# Patient Record
Sex: MALE | Race: WHITE | NOT HISPANIC OR LATINO | Employment: FULL TIME | URBAN - METROPOLITAN AREA
[De-identification: names, ages, dates, MRNs, and addresses within clinical notes are randomized per-mention and may not be internally consistent; named-entity substitution may affect disease eponyms.]

---

## 2022-06-06 ENCOUNTER — OFFICE VISIT (OUTPATIENT)
Dept: INTERNAL MEDICINE | Facility: CLINIC | Age: 57
End: 2022-06-06

## 2022-06-06 ENCOUNTER — LAB (OUTPATIENT)
Dept: LAB | Facility: HOSPITAL | Age: 57
End: 2022-06-06

## 2022-06-06 VITALS
HEIGHT: 71 IN | BODY MASS INDEX: 31.78 KG/M2 | HEART RATE: 80 BPM | RESPIRATION RATE: 16 BRPM | DIASTOLIC BLOOD PRESSURE: 86 MMHG | WEIGHT: 227 LBS | SYSTOLIC BLOOD PRESSURE: 150 MMHG | OXYGEN SATURATION: 98 % | TEMPERATURE: 98.4 F

## 2022-06-06 DIAGNOSIS — Z12.11 COLON CANCER SCREENING: ICD-10-CM

## 2022-06-06 DIAGNOSIS — Z00.00 ANNUAL PHYSICAL EXAM: ICD-10-CM

## 2022-06-06 DIAGNOSIS — E66.09 CLASS 1 OBESITY DUE TO EXCESS CALORIES WITH SERIOUS COMORBIDITY AND BODY MASS INDEX (BMI) OF 31.0 TO 31.9 IN ADULT: ICD-10-CM

## 2022-06-06 DIAGNOSIS — G47.33 OSA (OBSTRUCTIVE SLEEP APNEA): ICD-10-CM

## 2022-06-06 DIAGNOSIS — R53.83 FATIGUE, UNSPECIFIED TYPE: ICD-10-CM

## 2022-06-06 DIAGNOSIS — I10 ESSENTIAL HYPERTENSION: Primary | ICD-10-CM

## 2022-06-06 LAB
ALBUMIN SERPL-MCNC: 4.6 G/DL (ref 3.5–5.2)
ALBUMIN/GLOB SERPL: 1.7 G/DL
ALP SERPL-CCNC: 83 U/L (ref 39–117)
ALT SERPL W P-5'-P-CCNC: 33 U/L (ref 1–41)
ANION GAP SERPL CALCULATED.3IONS-SCNC: 12 MMOL/L (ref 5–15)
AST SERPL-CCNC: 33 U/L (ref 1–40)
BILIRUB SERPL-MCNC: 0.8 MG/DL (ref 0–1.2)
BUN SERPL-MCNC: 10 MG/DL (ref 6–20)
BUN/CREAT SERPL: 9.7 (ref 7–25)
CALCIUM SPEC-SCNC: 9.1 MG/DL (ref 8.6–10.5)
CHLORIDE SERPL-SCNC: 103 MMOL/L (ref 98–107)
CHOLEST SERPL-MCNC: 177 MG/DL (ref 0–200)
CO2 SERPL-SCNC: 23 MMOL/L (ref 22–29)
CREAT SERPL-MCNC: 1.03 MG/DL (ref 0.76–1.27)
DEPRECATED RDW RBC AUTO: 39.8 FL (ref 37–54)
EGFRCR SERPLBLD CKD-EPI 2021: 84.7 ML/MIN/1.73
ERYTHROCYTE [DISTWIDTH] IN BLOOD BY AUTOMATED COUNT: 12.8 % (ref 12.3–15.4)
GLOBULIN UR ELPH-MCNC: 2.7 GM/DL
GLUCOSE SERPL-MCNC: 75 MG/DL (ref 65–99)
HBA1C MFR BLD: 5.5 % (ref 4.8–5.6)
HCT VFR BLD AUTO: 44.2 % (ref 37.5–51)
HDLC SERPL-MCNC: 52 MG/DL (ref 40–60)
HGB BLD-MCNC: 14.8 G/DL (ref 13–17.7)
LDLC SERPL CALC-MCNC: 99 MG/DL (ref 0–100)
LDLC/HDLC SERPL: 1.82 {RATIO}
MCH RBC QN AUTO: 29 PG (ref 26.6–33)
MCHC RBC AUTO-ENTMCNC: 33.5 G/DL (ref 31.5–35.7)
MCV RBC AUTO: 86.7 FL (ref 79–97)
PLATELET # BLD AUTO: 215 10*3/MM3 (ref 140–450)
PMV BLD AUTO: 11.8 FL (ref 6–12)
POTASSIUM SERPL-SCNC: 3.7 MMOL/L (ref 3.5–5.2)
PROT SERPL-MCNC: 7.3 G/DL (ref 6–8.5)
RBC # BLD AUTO: 5.1 10*6/MM3 (ref 4.14–5.8)
SODIUM SERPL-SCNC: 138 MMOL/L (ref 136–145)
TRIGL SERPL-MCNC: 151 MG/DL (ref 0–150)
VLDLC SERPL-MCNC: 26 MG/DL (ref 5–40)
WBC NRBC COR # BLD: 5.15 10*3/MM3 (ref 3.4–10.8)

## 2022-06-06 PROCEDURE — 83036 HEMOGLOBIN GLYCOSYLATED A1C: CPT | Performed by: INTERNAL MEDICINE

## 2022-06-06 PROCEDURE — 99214 OFFICE O/P EST MOD 30 MIN: CPT | Performed by: INTERNAL MEDICINE

## 2022-06-06 PROCEDURE — 99386 PREV VISIT NEW AGE 40-64: CPT | Performed by: INTERNAL MEDICINE

## 2022-06-06 PROCEDURE — 80061 LIPID PANEL: CPT | Performed by: INTERNAL MEDICINE

## 2022-06-06 PROCEDURE — 80050 GENERAL HEALTH PANEL: CPT | Performed by: INTERNAL MEDICINE

## 2022-06-06 PROCEDURE — 82607 VITAMIN B-12: CPT | Performed by: INTERNAL MEDICINE

## 2022-06-06 NOTE — PROGRESS NOTES
"veronica       Office Note      Date: 2022  Patient Name: Ramon Crouch  MRN: [id number]  : 1965    Chief Complaint   Patient presents with   • Fatigue   • Hypertension       History of Present Illness: Ramon Crouch is a 56 y.o. male who presents for Fatigue and Hypertension.   Previously weighed 210 lbs last year. Has gained weight but is working on weight loss. Has veronica and just got his CPAP back from his move today. BP when he checks at home is in the systolics 130's.   Subjective      Review of Systems:   Pertinent review of systems per HPI.    Review of Systems   Constitutional: Positive for fatigue. Negative for activity change, appetite change, chills and diaphoresis.   HENT: Negative for congestion, dental problem, drooling, ear discharge, ear pain, facial swelling and hearing loss.    Eyes: Negative for photophobia, pain, discharge, redness, itching and visual disturbance.   Respiratory: Negative for cough, choking, chest tightness and shortness of breath.    Cardiovascular: Negative for chest pain, palpitations and leg swelling.   Endocrine: Negative for cold intolerance, heat intolerance, polydipsia and polyuria.   Genitourinary: Negative for difficulty urinating, dysuria, flank pain, frequency and hematuria.   Musculoskeletal: Negative for arthralgias and back pain.   Skin: Negative for color change, pallor, rash and wound.   Allergic/Immunologic: Negative for environmental allergies.   Neurological: Negative for dizziness, facial asymmetry, light-headedness and headaches.   Psychiatric/Behavioral: Negative.    All other systems reviewed and are negative.    No Known Allergies    Objective     Physical Exam:  Vital Signs:   Vitals:    22 1446   BP: 150/86   Pulse: 80   Resp: 16   Temp: 98.4 °F (36.9 °C)   TempSrc: Temporal   SpO2: 98%   Weight: 103 kg (227 lb)   Height: 180.3 cm (71\")      Body mass index is 31.66 kg/m².    Physical Exam  Vitals and nursing note reviewed. "   Constitutional:       General: He is not in acute distress.     Appearance: He is well-developed.   HENT:      Head: Normocephalic and atraumatic.      Right Ear: External ear normal.      Left Ear: External ear normal.   Eyes:      General: No scleral icterus.        Right eye: No discharge.         Left eye: No discharge.      Conjunctiva/sclera: Conjunctivae normal.   Cardiovascular:      Rate and Rhythm: Normal rate and regular rhythm.      Heart sounds: Normal heart sounds. No murmur heard.    No friction rub. No gallop.   Pulmonary:      Effort: Pulmonary effort is normal. No respiratory distress.      Breath sounds: Normal breath sounds. No wheezing or rales.   Musculoskeletal:      Right lower leg: No edema.      Left lower leg: No edema.   Skin:     General: Skin is warm and dry.      Coloration: Skin is not pale.         Assessment / Plan      Assessment & Plan:    1. Essential hypertension  Discussed weight loss, keep BP log at home and if SBP above 140-150 f/u in clinic    2. Annual physical exam  Counseled on:  Mammo starting at age 40  Pap smear q5 years if normal pap cytology with neg HPV, otherwise q3 years  Colonoscopy at 43 y/o  PNA vaccinations starting at age 65  shingrix at age 50  Td/Tdap q 10 years  Wear seatbelt when driving  Flu shot annually]  - Comprehensive Metabolic Panel  - CBC (No Diff)  - Lipid Panel  - TSH Rfx On Abnormal To Free T4  - Vitamin B12  - Hemoglobin A1c    3. Fatigue, unspecified type  Likely due to cpap non compliance. Checking tsh, cbc and vitb12    4. Colon cancer screening    - Ambulatory Referral to Gastroenterology    5. TYSON (obstructive sleep apnea)  Discussed se of untx TYSON including weight gain, htn and fatigue.     6. Class 1 obesity due to excess calories with serious comorbidity and body mass index (BMI) of 31.0 to 31.9 in adult  disucssed weight loss, diet and exercise. Check a1c, tsh, and lipid panel      Anabel Keller MD  06/06/2022

## 2022-06-07 LAB
TSH SERPL DL<=0.05 MIU/L-ACNC: 1.03 UIU/ML (ref 0.27–4.2)
VIT B12 BLD-MCNC: 236 PG/ML (ref 211–946)

## 2022-07-21 DIAGNOSIS — Z12.11 ENCOUNTER FOR SCREENING COLONOSCOPY: Primary | ICD-10-CM

## 2022-08-12 ENCOUNTER — OUTSIDE FACILITY SERVICE (OUTPATIENT)
Dept: GASTROENTEROLOGY | Facility: CLINIC | Age: 57
End: 2022-08-12

## 2022-08-12 PROCEDURE — 88305 TISSUE EXAM BY PATHOLOGIST: CPT | Performed by: INTERNAL MEDICINE

## 2022-08-12 PROCEDURE — 45385 COLONOSCOPY W/LESION REMOVAL: CPT | Performed by: INTERNAL MEDICINE

## 2022-08-15 ENCOUNTER — LAB REQUISITION (OUTPATIENT)
Dept: LAB | Facility: HOSPITAL | Age: 57
End: 2022-08-15

## 2022-08-15 DIAGNOSIS — K64.8 OTHER HEMORRHOIDS: ICD-10-CM

## 2022-08-15 DIAGNOSIS — D12.4 BENIGN NEOPLASM OF DESCENDING COLON: ICD-10-CM

## 2022-08-15 DIAGNOSIS — D12.3 BENIGN NEOPLASM OF TRANSVERSE COLON: ICD-10-CM

## 2022-08-15 DIAGNOSIS — D12.0 BENIGN NEOPLASM OF CECUM: ICD-10-CM

## 2022-08-15 DIAGNOSIS — D12.5 BENIGN NEOPLASM OF SIGMOID COLON: ICD-10-CM

## 2022-08-15 DIAGNOSIS — Q43.8 OTHER SPECIFIED CONGENITAL MALFORMATIONS OF INTESTINE: ICD-10-CM

## 2022-08-15 DIAGNOSIS — Z12.11 ENCOUNTER FOR SCREENING FOR MALIGNANT NEOPLASM OF COLON: ICD-10-CM

## 2022-08-15 DIAGNOSIS — D12.8 BENIGN NEOPLASM OF RECTUM: ICD-10-CM

## 2022-08-16 LAB
CYTO UR: NORMAL
LAB AP CASE REPORT: NORMAL
LAB AP CLINICAL INFORMATION: NORMAL
PATH REPORT.FINAL DX SPEC: NORMAL
PATH REPORT.GROSS SPEC: NORMAL

## 2022-11-17 ENCOUNTER — OFFICE VISIT (OUTPATIENT)
Dept: INTERNAL MEDICINE | Facility: CLINIC | Age: 57
End: 2022-11-17

## 2022-11-17 VITALS
HEART RATE: 68 BPM | HEIGHT: 71 IN | SYSTOLIC BLOOD PRESSURE: 120 MMHG | DIASTOLIC BLOOD PRESSURE: 70 MMHG | WEIGHT: 226 LBS | BODY MASS INDEX: 31.64 KG/M2 | TEMPERATURE: 96.9 F

## 2022-11-17 DIAGNOSIS — K42.9 UMBILICAL HERNIA WITHOUT OBSTRUCTION AND WITHOUT GANGRENE: Primary | ICD-10-CM

## 2022-11-17 PROCEDURE — 99213 OFFICE O/P EST LOW 20 MIN: CPT | Performed by: INTERNAL MEDICINE

## 2022-12-05 NOTE — PROGRESS NOTES
"     Office Note      Date: 2022  Patient Name: Duncan Crouch  MRN: [id number]  : 1965    Chief Complaint   Patient presents with   • Hearing Problem       History of Present Illness: Duncan Crouch is a 57 y.o. male who presents for Hearing Problem. Has tender umbilical hernia that has gotten larger.     Subjective      Review of Systems:   Pertinent review of systems per HPI.    Review of Systems   Constitutional: Negative for activity change, appetite change, chills, diaphoresis and fatigue.   HENT: Negative for congestion, dental problem, drooling, ear discharge, ear pain, facial swelling and hearing loss.    Eyes: Negative for photophobia, pain, discharge, redness, itching and visual disturbance.   Respiratory: Negative for cough, choking, chest tightness and shortness of breath.    Cardiovascular: Negative for chest pain, palpitations and leg swelling.   Gastrointestinal: Positive for abdominal pain.   Endocrine: Negative for cold intolerance, heat intolerance, polydipsia and polyuria.   Genitourinary: Negative for difficulty urinating, dysuria, flank pain, frequency and hematuria.   Musculoskeletal: Negative for arthralgias and back pain.   Skin: Negative for color change, pallor, rash and wound.   Allergic/Immunologic: Negative for environmental allergies.   Neurological: Negative for dizziness, facial asymmetry, light-headedness and headaches.   Psychiatric/Behavioral: Negative.    All other systems reviewed and are negative.    No Known Allergies    Objective     Physical Exam:  Vital Signs:   Vitals:    22 1158   BP: 120/70   BP Location: Left arm   Patient Position: Sitting   Pulse: 68   Temp: 96.9 °F (36.1 °C)   TempSrc: Infrared   Weight: 103 kg (226 lb)   Height: 180.3 cm (70.98\")      Body mass index is 31.53 kg/m².    Physical Exam  Vitals and nursing note reviewed.   Constitutional:       General: He is not in acute distress.     Appearance: He is well-developed. "   HENT:      Head: Normocephalic and atraumatic.      Right Ear: External ear normal.      Left Ear: External ear normal.   Eyes:      General: No scleral icterus.        Right eye: No discharge.         Left eye: No discharge.      Conjunctiva/sclera: Conjunctivae normal.   Cardiovascular:      Rate and Rhythm: Normal rate and regular rhythm.      Heart sounds: Normal heart sounds. No murmur heard.    No friction rub. No gallop.   Pulmonary:      Effort: Pulmonary effort is normal. No respiratory distress.      Breath sounds: Normal breath sounds. No wheezing or rales.   Abdominal:      General: Abdomen is flat. Bowel sounds are normal. There is no distension.      Palpations: Abdomen is soft. There is no mass.      Tenderness: There is no abdominal tenderness.      Hernia: A hernia (umbilical hernia, not reducible) is present.   Skin:     General: Skin is warm and dry.      Coloration: Skin is not pale.         Assessment / Plan      Assessment & Plan:    1. Umbilical hernia without obstruction and without gangrene  Avoid heavy lifting, work on weight loss. Ref to sx and get u/s for further eval  - US abdomen limited; Future  - Ambulatory Referral to General Surgery      Anabel Keller MD  11/17/2022

## 2022-12-07 ENCOUNTER — HOSPITAL ENCOUNTER (OUTPATIENT)
Dept: ULTRASOUND IMAGING | Facility: HOSPITAL | Age: 57
Discharge: HOME | End: 2022-12-07
Admitting: INTERNAL MEDICINE

## 2022-12-07 DIAGNOSIS — K42.9 UMBILICAL HERNIA WITHOUT OBSTRUCTION AND WITHOUT GANGRENE: ICD-10-CM

## 2022-12-07 PROCEDURE — 76705 ECHO EXAM OF ABDOMEN: CPT

## 2023-11-02 ENCOUNTER — LAB (OUTPATIENT)
Dept: INTERNAL MEDICINE | Facility: CLINIC | Age: 58
End: 2023-11-02
Payer: COMMERCIAL

## 2023-11-02 ENCOUNTER — OFFICE VISIT (OUTPATIENT)
Dept: INTERNAL MEDICINE | Facility: CLINIC | Age: 58
End: 2023-11-02
Payer: COMMERCIAL

## 2023-11-02 VITALS
SYSTOLIC BLOOD PRESSURE: 116 MMHG | BODY MASS INDEX: 33.32 KG/M2 | HEART RATE: 86 BPM | HEIGHT: 71 IN | WEIGHT: 238 LBS | DIASTOLIC BLOOD PRESSURE: 80 MMHG | OXYGEN SATURATION: 91 %

## 2023-11-02 DIAGNOSIS — R97.20 ELEVATED PSA: ICD-10-CM

## 2023-11-02 DIAGNOSIS — M1A.0710 IDIOPATHIC CHRONIC GOUT OF RIGHT FOOT WITHOUT TOPHUS: ICD-10-CM

## 2023-11-02 DIAGNOSIS — Z11.4 SCREENING FOR HIV (HUMAN IMMUNODEFICIENCY VIRUS): ICD-10-CM

## 2023-11-02 DIAGNOSIS — Z00.00 ANNUAL PHYSICAL EXAM: Primary | ICD-10-CM

## 2023-11-02 DIAGNOSIS — Z11.59 NEED FOR HEPATITIS C SCREENING TEST: ICD-10-CM

## 2023-11-02 DIAGNOSIS — N52.1 ERECTILE DYSFUNCTION DUE TO DISEASES CLASSIFIED ELSEWHERE: ICD-10-CM

## 2023-11-02 DIAGNOSIS — Z12.11 SCREENING FOR COLORECTAL CANCER: ICD-10-CM

## 2023-11-02 DIAGNOSIS — R68.82 LOW LIBIDO: ICD-10-CM

## 2023-11-02 DIAGNOSIS — E53.8 B12 DEFICIENCY: ICD-10-CM

## 2023-11-02 DIAGNOSIS — Z12.12 SCREENING FOR COLORECTAL CANCER: ICD-10-CM

## 2023-11-02 DIAGNOSIS — Z12.5 PROSTATE CANCER SCREENING: ICD-10-CM

## 2023-11-02 DIAGNOSIS — R73.03 PREDIABETES: ICD-10-CM

## 2023-11-02 DIAGNOSIS — K42.9 UMBILICAL HERNIA WITHOUT OBSTRUCTION AND WITHOUT GANGRENE: ICD-10-CM

## 2023-11-02 DIAGNOSIS — E55.9 VITAMIN D DEFICIENCY: ICD-10-CM

## 2023-11-02 LAB
25(OH)D3 SERPL-MCNC: 19.7 NG/ML (ref 30–100)
BASOPHILS # BLD AUTO: 0.05 10*3/MM3 (ref 0–0.2)
BASOPHILS NFR BLD AUTO: 0.8 % (ref 0–1.5)
DEPRECATED RDW RBC AUTO: 40.4 FL (ref 37–54)
EOSINOPHIL # BLD AUTO: 0.07 10*3/MM3 (ref 0–0.4)
EOSINOPHIL NFR BLD AUTO: 1.1 % (ref 0.3–6.2)
ERYTHROCYTE [DISTWIDTH] IN BLOOD BY AUTOMATED COUNT: 12.8 % (ref 12.3–15.4)
HCT VFR BLD AUTO: 44.2 % (ref 37.5–51)
HCV AB SER DONR QL: NORMAL
HGB BLD-MCNC: 15.2 G/DL (ref 13–17.7)
HIV 1+2 AB+HIV1 P24 AG SERPL QL IA: NORMAL
IMM GRANULOCYTES # BLD AUTO: 0.04 10*3/MM3 (ref 0–0.05)
IMM GRANULOCYTES NFR BLD AUTO: 0.6 % (ref 0–0.5)
LYMPHOCYTES # BLD AUTO: 2.31 10*3/MM3 (ref 0.7–3.1)
LYMPHOCYTES NFR BLD AUTO: 35.5 % (ref 19.6–45.3)
MCH RBC QN AUTO: 30 PG (ref 26.6–33)
MCHC RBC AUTO-ENTMCNC: 34.4 G/DL (ref 31.5–35.7)
MCV RBC AUTO: 87.4 FL (ref 79–97)
MONOCYTES # BLD AUTO: 0.4 10*3/MM3 (ref 0.1–0.9)
MONOCYTES NFR BLD AUTO: 6.2 % (ref 5–12)
NEUTROPHILS NFR BLD AUTO: 3.63 10*3/MM3 (ref 1.7–7)
NEUTROPHILS NFR BLD AUTO: 55.8 % (ref 42.7–76)
NRBC BLD AUTO-RTO: 0 /100 WBC (ref 0–0.2)
PLATELET # BLD AUTO: 213 10*3/MM3 (ref 140–450)
PMV BLD AUTO: 11.6 FL (ref 6–12)
PSA SERPL-MCNC: 6.48 NG/ML (ref 0–4)
RBC # BLD AUTO: 5.06 10*6/MM3 (ref 4.14–5.8)
TSH SERPL DL<=0.05 MIU/L-ACNC: 0.99 UIU/ML (ref 0.27–4.2)
VIT B12 BLD-MCNC: 207 PG/ML (ref 211–946)
WBC NRBC COR # BLD: 6.5 10*3/MM3 (ref 3.4–10.8)

## 2023-11-02 PROCEDURE — 86803 HEPATITIS C AB TEST: CPT | Performed by: INTERNAL MEDICINE

## 2023-11-02 PROCEDURE — G0103 PSA SCREENING: HCPCS | Performed by: INTERNAL MEDICINE

## 2023-11-02 PROCEDURE — 82306 VITAMIN D 25 HYDROXY: CPT | Performed by: INTERNAL MEDICINE

## 2023-11-02 PROCEDURE — G0432 EIA HIV-1/HIV-2 SCREEN: HCPCS | Performed by: INTERNAL MEDICINE

## 2023-11-02 PROCEDURE — 87086 URINE CULTURE/COLONY COUNT: CPT | Performed by: INTERNAL MEDICINE

## 2023-11-02 PROCEDURE — 84550 ASSAY OF BLOOD/URIC ACID: CPT | Performed by: INTERNAL MEDICINE

## 2023-11-02 PROCEDURE — 84270 ASSAY OF SEX HORMONE GLOBUL: CPT | Performed by: INTERNAL MEDICINE

## 2023-11-02 PROCEDURE — 80050 GENERAL HEALTH PANEL: CPT | Performed by: INTERNAL MEDICINE

## 2023-11-02 PROCEDURE — 82607 VITAMIN B-12: CPT | Performed by: INTERNAL MEDICINE

## 2023-11-02 PROCEDURE — 83036 HEMOGLOBIN GLYCOSYLATED A1C: CPT | Performed by: INTERNAL MEDICINE

## 2023-11-02 PROCEDURE — 80061 LIPID PANEL: CPT | Performed by: INTERNAL MEDICINE

## 2023-11-02 RX ORDER — ALLOPURINOL 100 MG/1
100 TABLET ORAL DAILY
Qty: 30 TABLET | Refills: 2 | Status: SHIPPED | OUTPATIENT
Start: 2023-11-02

## 2023-11-02 RX ORDER — TADALAFIL 5 MG/1
5 TABLET ORAL DAILY PRN
Qty: 30 TABLET | Refills: 5 | Status: SHIPPED | OUTPATIENT
Start: 2023-11-02

## 2023-11-02 NOTE — PROGRESS NOTES
Male Physical Note      Date: 2023   Patient Name: Duncan Crouch  : 1965   MRN: [id number]     Chief Complaint:    Chief Complaint   Patient presents with    Follow-up       History of Present Illness: Duncan Crouch is a 58 y.o. male who is here today for their annual health maintenance and physical. Pt  with recent gout flare. He is wanting to go on chronic suppressive medication.  He also has an umblical hernia and wants referral for repair.      Subjective      Review of Systems   Constitutional:  Negative for activity change, chills, diaphoresis and fever.   HENT:  Negative for congestion and ear pain.    Eyes:  Negative for discharge and redness.   Respiratory:  Negative for cough, chest tightness and shortness of breath.    Cardiovascular:  Negative for chest pain.   Gastrointestinal:  Negative for abdominal distention and abdominal pain.   Endocrine: Negative for cold intolerance and heat intolerance.   Genitourinary:  Negative for flank pain and hematuria.   Musculoskeletal:  Negative for arthralgias and back pain.   Neurological:  Negative for dizziness, light-headedness and headaches.   Psychiatric/Behavioral:  Negative for agitation and confusion.         I have reviewed the following portions of the patient's history and these were updated and discussed with the patient as appropriate: past family history, past medical history, past social history, past surgical history, and problem list.      Medications:     Current Outpatient Medications:     Sod Picosulfate-Mag Ox-Cit Acd 10-3.5-12 MG-GM -GM/160ML solution, Take 160 mL by mouth Take As Directed for 2 doses., Disp: 320 mL, Rfl: 0    allopurinol (ZYLOPRIM) 100 MG tablet, Take 1 tablet by mouth Daily., Disp: 30 tablet, Rfl: 2    Cholecalciferol (Vitamin D) 50 MCG ( UT) tablet, Take 1 tablet by mouth Daily., Disp: 30 tablet, Rfl: 5    Cyanocobalamin (B-12) 1000 MCG tablet, Take 1 tablet by mouth Daily., Disp: 30 tablet,  Rfl: 2    metFORMIN (GLUCOPHAGE) 500 MG tablet, Take 1 tablet by mouth Daily With Breakfast., Disp: 30 tablet, Rfl: 5    tadalafil (Cialis) 5 MG tablet, Take 1 tablet by mouth Daily As Needed for Erectile Dysfunction., Disp: 30 tablet, Rfl: 5    Allergies:   No Known Allergies    Immunizations:  Immunization History   Administered Date(s) Administered    COVID-19 ([place]) 2021    Covid-19 ([employer]) Gray Cap Monovalent 2022    Dt, Ipv Adsorbed Historical 2017      Colorectal Screening:     Last Completed Colonoscopy            COLORECTAL CANCER SCREENING (COLONOSCOPY - Every 10 Years) Next due on 2022  SCANNED - COLONOSCOPY                  CT for Smoker (Age 55-75, 30pk yr):   US Aorta (For male smokers, age 65):   PSA(Over age 50): PSA today  Hep C ( 1939-1126): get today  HIV get today    Diet/Physical activity:  Poor diet and exercise    Sexual health: has ED    Sexual Health Inventory for Men (OUMAR)   Over the past 6 months:     1. How do you rate your confidence that you could get and keep an erection?  2 - Low   2. When you had erections with sexual     stimulation, how often were your erections hard enough for penetration (entering your partner)?  3 - Sometimes (About half the time)    3. During sexual intercourse, how often were you able to maintain your erection after you had penetrated (entered) your partner?  5 - Almost always or always    4. During sexual intercourse, how difficult was it to maintain your erection to completion of intercourse?  5 - Not difficult    5. When you attempted sexual intercourse, how often was it satisfactory for you?  4 - Most times ( much more than, half the time)    Total Score: 19   The Sexual Health Inventory for Men further classifies ED severity with the following breakpoints:   1-7 (Severe ED) 8-11 (Moderate ED) 12-16 (Mild to Moderate ED) 17-21 (Mild ED)        PHQ-9 Depression Screening  Little interest or pleasure in doing  "things? 0-->not at all   Feeling down, depressed, or hopeless? 0-->not at all   Trouble falling or staying asleep, or sleeping too much?     Feeling tired or having little energy?     Poor appetite or overeating?     Feeling bad about yourself - or that you are a failure or have let yourself or your family down?     Trouble concentrating on things, such as reading the newspaper or watching television?     Moving or speaking so slowly that other people could have noticed? Or the opposite - being so fidgety or restless that you have been moving around a lot more than usual?     Thoughts that you would be better off dead, or of hurting yourself in some way?     PHQ-9 Total Score 0   If you checked off any problems, how difficult have these problems made it for you to do your work, take care of things at home, or get along with other people?       ADINA-7        Objective     Physical Exam:  Vital Signs:   Vitals:    11/02/23 1301   BP: 116/80   Pulse: 86   SpO2: 91%   Weight: 108 kg (238 lb)   Height: 180.3 cm (70.98\")     Body mass index is 33.21 kg/m².     Physical Exam  Constitutional:       General: He is not in acute distress.     Appearance: Normal appearance. He is not ill-appearing.   HENT:      Right Ear: Tympanic membrane normal.      Left Ear: Tympanic membrane and ear canal normal.      Nose: No congestion or rhinorrhea.      Mouth/Throat:      Mouth: Mucous membranes are moist.      Pharynx: No oropharyngeal exudate.   Eyes:      Extraocular Movements: Extraocular movements intact.      Conjunctiva/sclera: Conjunctivae normal.      Pupils: Pupils are equal, round, and reactive to light.   Cardiovascular:      Rate and Rhythm: Normal rate and regular rhythm.      Heart sounds: No murmur heard.  Pulmonary:      Effort: Pulmonary effort is normal. No respiratory distress.   Abdominal:      General: Abdomen is flat. Bowel sounds are normal.      Palpations: Abdomen is soft.      Tenderness: There is no abdominal " "tenderness.   Musculoskeletal:      Right lower leg: No edema.      Left lower leg: No edema.   Skin:     General: Skin is warm and dry.      Findings: No rash.   Neurological:      General: No focal deficit present.      Mental Status: He is alert and oriented to person, place, and time. Mental status is at baseline.   Psychiatric:         Mood and Affect: Mood normal.         Behavior: Behavior normal.         Procedures    LABS:   Lab Results   Component Value Date    HGBA1C 5.70 (H) 11/02/2023     No results found for: \"MICROALBUR\", \"[id number]\"     Assessment / Plan      Assessment/Plan:   Diagnoses and all orders for this visit:    1. Annual physical exam (Primary)  -     Hemoglobin A1c  -     Vitamin B12  -     TSH  -     Lipid Panel  -     Comprehensive Metabolic Panel  -     CBC & Differential  -     Urine Culture - Urine, Urine, Clean Catch    2. Vitamin D deficiency  -     Vitamin D,25-Hydroxy  -     Cholecalciferol (Vitamin D) 50 MCG (2000 UT) tablet; Take 1 tablet by mouth Daily.  Dispense: 30 tablet; Refill: 5    3. Need for hepatitis C screening test  -     Hepatitis C Antibody    4. Screening for HIV (human immunodeficiency virus)  -     HIV-1 / O / 2 Ag / Antibody    5. Idiopathic chronic gout of right foot without tophus  -     Uric acid    6. Umbilical hernia without obstruction and without gangrene  -     Ambulatory Referral to General Surgery    7. Screening for colorectal cancer  -     Ambulatory Referral to Gastroenterology    8. Prostate cancer screening  -     PSA SCREENING    9. Low libido  -     Testosterone, Free, Total; Future  -     Sex Horm Binding Globulin; Future    10. Erectile dysfunction due to diseases classified elsewhere  -     tadalafil (Cialis) 5 MG tablet; Take 1 tablet by mouth Daily As Needed for Erectile Dysfunction.  Dispense: 30 tablet; Refill: 5    11. Prediabetes  -     metFORMIN (GLUCOPHAGE) 500 MG tablet; Take 1 tablet by mouth Daily With Breakfast.  Dispense: 30 " tablet; Refill: 5    12. Elevated PSA    13. B12 deficiency  -     Cyanocobalamin (B-12) 1000 MCG tablet; Take 1 tablet by mouth Daily.  Dispense: 30 tablet; Refill: 2    Other orders  -     allopurinol (ZYLOPRIM) 100 MG tablet; Take 1 tablet by mouth Daily.  Dispense: 30 tablet; Refill: 2         Having ED.  Will try cialis.  His weight is a problem. He is going to start exercising and wants to go back on low carb. Recommended weight watchers.  Need to lose 20lbs by  next visit.  Recheck uric acid next visit.     Labs returned with low vit d and b12.  Supplement.     PSA elevated.  Will refer to urology for further monitoring and workup.  Possible just bph    Pt now prediabetic. Start metformin.  Likely start ozempic at next visit.     Recheck cholesterol after weight loss    BMI is >= 30 and <35. (Class 1 Obesity). The following options were offered after discussion;: weight loss educational material (shared in after visit summary) and exercise counseling/recommendations       Follow Up:   Return in about 3 months (around 2/2/2024) for Recheck.    Healthcare Maintenance:   Counseling provided on exercise, nutrition, age-appropriate screening test, and appropriate lab studies.   Duncan Crouch voices understanding and acceptance of this advice and will call back with any further questions or concerns. AVS with preventive healthcare tips printed for patient.     Reviewed the following with the patient: [employer] educational material given to patient, encouraged patient to exercise 5-7 days per week for 30 minutes at a time, and Weight Watchers encouraged.      Marcos Allan DO   [facility] [place] [facility]

## 2023-11-03 PROBLEM — E55.9 VITAMIN D DEFICIENCY: Status: ACTIVE | Noted: 2023-11-03

## 2023-11-03 PROBLEM — R68.82 LOW LIBIDO: Status: ACTIVE | Noted: 2023-11-03

## 2023-11-03 PROBLEM — N52.1 ERECTILE DYSFUNCTION DUE TO DISEASES CLASSIFIED ELSEWHERE: Status: ACTIVE | Noted: 2023-11-03

## 2023-11-03 PROBLEM — R73.03 PREDIABETES: Status: ACTIVE | Noted: 2023-11-03

## 2023-11-03 PROBLEM — R97.20 ELEVATED PSA: Status: ACTIVE | Noted: 2023-11-03

## 2023-11-03 PROBLEM — E53.8 B12 DEFICIENCY: Status: ACTIVE | Noted: 2023-11-03

## 2023-11-03 LAB
ALBUMIN SERPL-MCNC: 4.5 G/DL (ref 3.5–5.2)
ALBUMIN/GLOB SERPL: 1.5 G/DL
ALP SERPL-CCNC: 74 U/L (ref 39–117)
ALT SERPL W P-5'-P-CCNC: 30 U/L (ref 1–41)
ANION GAP SERPL CALCULATED.3IONS-SCNC: 12 MMOL/L (ref 5–15)
AST SERPL-CCNC: 30 U/L (ref 1–40)
BACTERIA SPEC AEROBE CULT: NO GROWTH
BILIRUB SERPL-MCNC: 0.8 MG/DL (ref 0–1.2)
BUN SERPL-MCNC: 12 MG/DL (ref 6–20)
BUN/CREAT SERPL: 10 (ref 7–25)
CALCIUM SPEC-SCNC: 9.6 MG/DL (ref 8.6–10.5)
CHLORIDE SERPL-SCNC: 104 MMOL/L (ref 98–107)
CHOLEST SERPL-MCNC: 214 MG/DL (ref 0–200)
CO2 SERPL-SCNC: 24 MMOL/L (ref 22–29)
CREAT SERPL-MCNC: 1.2 MG/DL (ref 0.76–1.27)
EGFRCR SERPLBLD CKD-EPI 2021: 69.7 ML/MIN/1.73
GLOBULIN UR ELPH-MCNC: 3.1 GM/DL
GLUCOSE SERPL-MCNC: 90 MG/DL (ref 65–99)
HBA1C MFR BLD: 5.7 % (ref 4.8–5.6)
HDLC SERPL-MCNC: 53 MG/DL (ref 40–60)
LDLC SERPL CALC-MCNC: 128 MG/DL (ref 0–100)
LDLC/HDLC SERPL: 2.32 {RATIO}
POTASSIUM SERPL-SCNC: 4 MMOL/L (ref 3.5–5.2)
PROT SERPL-MCNC: 7.6 G/DL (ref 6–8.5)
SODIUM SERPL-SCNC: 140 MMOL/L (ref 136–145)
TRIGL SERPL-MCNC: 189 MG/DL (ref 0–150)
URATE SERPL-MCNC: 9.4 MG/DL (ref 3.4–7)
VLDLC SERPL-MCNC: 33 MG/DL (ref 5–40)

## 2023-11-03 RX ORDER — CHOLECALCIFEROL (VITAMIN D3) 50 MCG
2000 TABLET ORAL DAILY
Qty: 30 TABLET | Refills: 5 | Status: SHIPPED | OUTPATIENT
Start: 2023-11-03

## 2023-11-03 RX ORDER — CYANOCOBALAMIN (VITAMIN B-12) 1000 MCG
1 TABLET ORAL DAILY
Qty: 30 TABLET | Refills: 2 | Status: SHIPPED | OUTPATIENT
Start: 2023-11-03

## 2023-11-04 LAB — SHBG SERPL-SCNC: 33.1 NMOL/L (ref 19.3–76.4)

## 2023-12-07 RX ORDER — SODIUM PICOSULFATE, MAGNESIUM OXIDE, AND ANHYDROUS CITRIC ACID 10; 3.5; 12 MG/160ML; G/160ML; G/160ML
350 LIQUID ORAL TAKE AS DIRECTED
Qty: 350 ML | Refills: 0 | Status: SHIPPED | OUTPATIENT
Start: 2023-12-07

## 2023-12-15 ENCOUNTER — OUTSIDE FACILITY SERVICE (OUTPATIENT)
Dept: GASTROENTEROLOGY | Facility: CLINIC | Age: 58
End: 2023-12-15
Payer: COMMERCIAL

## 2023-12-15 PROCEDURE — 88305 TISSUE EXAM BY PATHOLOGIST: CPT

## 2023-12-18 ENCOUNTER — LAB REQUISITION (OUTPATIENT)
Dept: LAB | Facility: HOSPITAL | Age: 58
End: 2023-12-18
Payer: COMMERCIAL

## 2023-12-18 DIAGNOSIS — D12.5 BENIGN NEOPLASM OF SIGMOID COLON: ICD-10-CM

## 2023-12-18 DIAGNOSIS — K64.8 OTHER HEMORRHOIDS: ICD-10-CM

## 2023-12-18 DIAGNOSIS — Z80.0 FAMILY HISTORY OF MALIGNANT NEOPLASM OF DIGESTIVE ORGANS: ICD-10-CM

## 2023-12-18 DIAGNOSIS — Z86.010 PERSONAL HISTORY OF COLONIC POLYPS: ICD-10-CM

## 2023-12-18 DIAGNOSIS — Z83.719 FAMILY HISTORY OF COLON POLYPS, UNSPECIFIED: ICD-10-CM

## 2023-12-18 DIAGNOSIS — D12.3 BENIGN NEOPLASM OF TRANSVERSE COLON: ICD-10-CM

## 2023-12-18 DIAGNOSIS — D12.1 BENIGN NEOPLASM OF APPENDIX: ICD-10-CM

## 2023-12-18 DIAGNOSIS — Z12.11 ENCOUNTER FOR SCREENING FOR MALIGNANT NEOPLASM OF COLON: ICD-10-CM

## 2023-12-18 DIAGNOSIS — D12.7 BENIGN NEOPLASM OF RECTOSIGMOID JUNCTION: ICD-10-CM

## 2023-12-19 LAB — REF LAB TEST METHOD: NORMAL

## 2024-02-09 ENCOUNTER — LAB (OUTPATIENT)
Dept: INTERNAL MEDICINE | Facility: CLINIC | Age: 59
End: 2024-02-09
Payer: COMMERCIAL

## 2024-02-09 ENCOUNTER — OFFICE VISIT (OUTPATIENT)
Dept: INTERNAL MEDICINE | Facility: CLINIC | Age: 59
End: 2024-02-09
Payer: COMMERCIAL

## 2024-02-09 VITALS
HEIGHT: 71 IN | HEART RATE: 76 BPM | BODY MASS INDEX: 31.22 KG/M2 | SYSTOLIC BLOOD PRESSURE: 128 MMHG | WEIGHT: 223 LBS | OXYGEN SATURATION: 97 % | TEMPERATURE: 96.8 F | DIASTOLIC BLOOD PRESSURE: 88 MMHG

## 2024-02-09 DIAGNOSIS — R68.82 LOW LIBIDO: ICD-10-CM

## 2024-02-09 DIAGNOSIS — E55.9 VITAMIN D DEFICIENCY: Primary | ICD-10-CM

## 2024-02-09 DIAGNOSIS — R97.20 ELEVATED PSA: ICD-10-CM

## 2024-02-09 DIAGNOSIS — N52.9 ERECTILE DYSFUNCTION, UNSPECIFIED ERECTILE DYSFUNCTION TYPE: ICD-10-CM

## 2024-02-09 DIAGNOSIS — R73.03 PREDIABETES: ICD-10-CM

## 2024-02-09 DIAGNOSIS — M1A.0710 IDIOPATHIC CHRONIC GOUT OF RIGHT FOOT WITHOUT TOPHUS: ICD-10-CM

## 2024-02-09 LAB — URATE SERPL-MCNC: 7.1 MG/DL (ref 3.4–7)

## 2024-02-09 PROCEDURE — 82306 VITAMIN D 25 HYDROXY: CPT | Performed by: INTERNAL MEDICINE

## 2024-02-09 PROCEDURE — 84403 ASSAY OF TOTAL TESTOSTERONE: CPT | Performed by: INTERNAL MEDICINE

## 2024-02-09 PROCEDURE — 84550 ASSAY OF BLOOD/URIC ACID: CPT | Performed by: INTERNAL MEDICINE

## 2024-02-09 PROCEDURE — 84402 ASSAY OF FREE TESTOSTERONE: CPT | Performed by: INTERNAL MEDICINE

## 2024-02-09 PROCEDURE — 99214 OFFICE O/P EST MOD 30 MIN: CPT | Performed by: INTERNAL MEDICINE

## 2024-02-09 PROCEDURE — 83036 HEMOGLOBIN GLYCOSYLATED A1C: CPT | Performed by: INTERNAL MEDICINE

## 2024-02-09 PROCEDURE — 84153 ASSAY OF PSA TOTAL: CPT | Performed by: INTERNAL MEDICINE

## 2024-02-09 PROCEDURE — 36415 COLL VENOUS BLD VENIPUNCTURE: CPT | Performed by: INTERNAL MEDICINE

## 2024-02-09 PROCEDURE — 84270 ASSAY OF SEX HORMONE GLOBUL: CPT | Performed by: INTERNAL MEDICINE

## 2024-02-09 RX ORDER — TADALAFIL 5 MG/1
5 TABLET ORAL DAILY PRN
Qty: 30 TABLET | Refills: 3 | Status: SHIPPED | OUTPATIENT
Start: 2024-02-09

## 2024-02-09 RX ORDER — FINASTERIDE 5 MG/1
5 TABLET, FILM COATED ORAL DAILY
Qty: 90 TABLET | Refills: 5 | Status: SHIPPED | OUTPATIENT
Start: 2024-02-09

## 2024-02-09 NOTE — PROGRESS NOTES
"     Follow Up Office Visit      Date: 2024   Patient Name: Duncan Crouch  : 1965   MRN: [id number]     Chief Complaint:    Chief Complaint   Patient presents with    Follow-up    Hypertension       History of Present Illness: Duncan Crouch is a 58 y.o. male who is here today to follow up with prediabetes, gout, elevated psa, htn, vit d def.   No gout flares since medicine.  BP doing good. Lost 15lbs did not start meformin       Subjective      Past Medical History:   Diagnosis Date    Fractures     GERD (gastroesophageal reflux disease)     Gout        No past surgical history on file.    No family history on file.     Social History     Socioeconomic History    Marital status: Single   Tobacco Use    Smoking status: Never    Smokeless tobacco: Never   Substance and Sexual Activity    Alcohol use: Yes     Comment: 5    Drug use: Never         I have reviewed the patients family history, social history, past medical history, past surgical history and have updated it as appropriate.     Medications:     Current Outpatient Medications:     allopurinol (ZYLOPRIM) 100 MG tablet, Take 1 tablet by mouth Daily., Disp: 30 tablet, Rfl: 2    Cholecalciferol (Vitamin D) 50 MCG (2000 UT) tablet, Take 1 tablet by mouth Daily., Disp: 30 tablet, Rfl: 5    finasteride (Proscar) 5 MG tablet, Take 1 tablet by mouth Daily., Disp: 90 tablet, Rfl: 5    Allergies:   No Known Allergies    Objective       Vital Signs:   Vitals:    24 1357   BP: 128/88   BP Location: Left arm   Patient Position: Sitting   Cuff Size: Adult   Pulse: 76   Temp: 96.8 °F (36 °C)   TempSrc: Tympanic   SpO2: 97%   Weight: 101 kg (223 lb)   Height: 180.3 cm (71\")     Body mass index is 31.1 kg/m².    Physical Exam:  Physical Exam  Constitutional:       General: He is not in acute distress.     Appearance: Normal appearance. He is not ill-appearing.   HENT:      Right Ear: Tympanic membrane normal.      Left Ear: Tympanic membrane and " ear canal normal.      Nose: No congestion or rhinorrhea.      Mouth/Throat:      Mouth: Mucous membranes are moist.      Pharynx: No oropharyngeal exudate.   Eyes:      Extraocular Movements: Extraocular movements intact.      Conjunctiva/sclera: Conjunctivae normal.      Pupils: Pupils are equal, round, and reactive to light.   Cardiovascular:      Rate and Rhythm: Normal rate and regular rhythm.      Heart sounds: No murmur heard.  Pulmonary:      Effort: Pulmonary effort is normal. No respiratory distress.   Abdominal:      General: Abdomen is flat. Bowel sounds are normal.      Palpations: Abdomen is soft.      Tenderness: There is no abdominal tenderness.   Genitourinary:     Comments: Prostate slightly enlarge to prob 50g.   Musculoskeletal:      Right lower leg: No edema.      Left lower leg: No edema.   Skin:     General: Skin is warm and dry.      Findings: No rash.   Neurological:      General: No focal deficit present.      Mental Status: He is alert and oriented to person, place, and time. Mental status is at baseline.   Psychiatric:         Mood and Affect: Mood normal.         Behavior: Behavior normal.         Procedures    Results:       Assessment / Plan      Assessment/Plan:   Diagnoses and all orders for this visit:    1. Vitamin D deficiency (Primary)  -     Vitamin D 25 hydroxy    2. Prediabetes  -     Hemoglobin A1c    3. Idiopathic chronic gout of right foot without tophus  -     Uric acid    4. Elevated PSA  -     Ambulatory Referral to Urology  -     PSA DIAGNOSTIC  -     finasteride (Proscar) 5 MG tablet; Take 1 tablet by mouth Daily.  Dispense: 90 tablet; Refill: 5    5. Erectile dysfunction, unspecified erectile dysfunction type  -     Testosterone, Free, Total; Future  -     Sex Horm Binding Globulin; Future       Likely BPH. Start proscar. Not much LUTS so hold off on other meds          Follow Up:   No follow-ups on file.    Marcos Allan DO    [facility] [place]  Answers submitted by  the patient for this visit:  Office Visit on 2/9/2024  2:00 PM with Marcos Allan  Primary Reason for Visit (Submitted on 2/7/2024)  What is the primary reason for your visit?: Other  Other (Submitted on 2/7/2024)  Please describe your symptoms.: Low Energy  Have you had these symptoms before?: Yes  How long have you been having these symptoms?: 1-4 days  Please list any medications you are currently taking for this condition.: Vitamin D-3

## 2024-02-10 LAB
25(OH)D3 SERPL-MCNC: 24.8 NG/ML (ref 30–100)
HBA1C MFR BLD: 5.6 % (ref 4.8–5.6)
PSA SERPL-MCNC: 6.49 NG/ML (ref 0–4)

## 2024-02-11 LAB — SHBG SERPL-SCNC: 43.9 NMOL/L (ref 19.3–76.4)

## 2024-02-12 DIAGNOSIS — E55.9 VITAMIN D DEFICIENCY: ICD-10-CM

## 2024-02-12 DIAGNOSIS — N52.9 ERECTILE DYSFUNCTION, UNSPECIFIED ERECTILE DYSFUNCTION TYPE: ICD-10-CM

## 2024-02-12 RX ORDER — CHOLECALCIFEROL (VITAMIN D3) 50 MCG
4000 TABLET ORAL DAILY
Qty: 60 TABLET | Refills: 5 | Status: SHIPPED | OUTPATIENT
Start: 2024-02-12

## 2024-02-13 DIAGNOSIS — M1A.0710 IDIOPATHIC CHRONIC GOUT OF RIGHT FOOT WITHOUT TOPHUS: Primary | ICD-10-CM

## 2024-02-13 RX ORDER — TADALAFIL 5 MG/1
5 TABLET ORAL DAILY PRN
Qty: 30 TABLET | Refills: 3 | OUTPATIENT
Start: 2024-02-13

## 2024-02-13 RX ORDER — CHOLECALCIFEROL (VITAMIN D3) 50 MCG
4000 TABLET ORAL DAILY
Qty: 60 TABLET | Refills: 5 | OUTPATIENT
Start: 2024-02-13

## 2024-02-13 RX ORDER — ALLOPURINOL 100 MG/1
100 TABLET ORAL DAILY
Qty: 30 TABLET | Refills: 2 | Status: SHIPPED | OUTPATIENT
Start: 2024-02-13

## 2024-02-13 RX ORDER — COLCHICINE 0.6 MG/1
0.6 TABLET ORAL 2 TIMES DAILY
Qty: 60 TABLET | Refills: 2 | Status: SHIPPED | OUTPATIENT
Start: 2024-02-13

## 2024-02-20 LAB
TESTOST FREE SERPL-MCNC: 3.7 PG/ML (ref 7.2–24)
TESTOST SERPL-MCNC: 345 NG/DL (ref 264–916)

## 2024-03-04 RX ORDER — ALLOPURINOL 100 MG/1
100 TABLET ORAL DAILY
Qty: 30 TABLET | Refills: 2 | Status: SHIPPED | OUTPATIENT
Start: 2024-03-04

## 2024-04-04 ENCOUNTER — OFFICE VISIT (OUTPATIENT)
Dept: INTERNAL MEDICINE | Facility: CLINIC | Age: 59
End: 2024-04-04
Payer: COMMERCIAL

## 2024-04-04 ENCOUNTER — LAB (OUTPATIENT)
Dept: INTERNAL MEDICINE | Facility: CLINIC | Age: 59
End: 2024-04-04
Payer: COMMERCIAL

## 2024-04-04 VITALS
HEIGHT: 71 IN | OXYGEN SATURATION: 97 % | RESPIRATION RATE: 20 BRPM | BODY MASS INDEX: 32.2 KG/M2 | WEIGHT: 230 LBS | DIASTOLIC BLOOD PRESSURE: 82 MMHG | TEMPERATURE: 98 F | SYSTOLIC BLOOD PRESSURE: 130 MMHG | HEART RATE: 76 BPM

## 2024-04-04 DIAGNOSIS — E53.8 B12 DEFICIENCY: ICD-10-CM

## 2024-04-04 DIAGNOSIS — R60.0 EDEMA OF BOTH LOWER EXTREMITIES: ICD-10-CM

## 2024-04-04 DIAGNOSIS — M79.89 LOCALIZED SWELLING OF LOWER EXTREMITY: Primary | ICD-10-CM

## 2024-04-04 DIAGNOSIS — N52.9 ERECTILE DYSFUNCTION, UNSPECIFIED ERECTILE DYSFUNCTION TYPE: ICD-10-CM

## 2024-04-04 DIAGNOSIS — M1A.0710 IDIOPATHIC CHRONIC GOUT OF RIGHT FOOT WITHOUT TOPHUS: Primary | ICD-10-CM

## 2024-04-04 DIAGNOSIS — N40.0 ENLARGED PROSTATE: ICD-10-CM

## 2024-04-04 PROCEDURE — 99214 OFFICE O/P EST MOD 30 MIN: CPT | Performed by: INTERNAL MEDICINE

## 2024-04-04 PROCEDURE — 36415 COLL VENOUS BLD VENIPUNCTURE: CPT | Performed by: INTERNAL MEDICINE

## 2024-04-04 PROCEDURE — 84550 ASSAY OF BLOOD/URIC ACID: CPT | Performed by: INTERNAL MEDICINE

## 2024-04-04 RX ORDER — NAPROXEN 500 MG/1
500 TABLET ORAL 2 TIMES DAILY PRN
Qty: 60 TABLET | Refills: 2 | Status: SHIPPED | OUTPATIENT
Start: 2024-04-04

## 2024-04-04 RX ORDER — TADALAFIL 10 MG/1
10 TABLET ORAL DAILY PRN
Qty: 30 TABLET | Refills: 0 | Status: SHIPPED | OUTPATIENT
Start: 2024-04-04

## 2024-04-04 RX ORDER — CYANOCOBALAMIN 1000 UG/ML
1000 INJECTION, SOLUTION INTRAMUSCULAR; SUBCUTANEOUS ONCE
Status: COMPLETED | OUTPATIENT
Start: 2024-04-04 | End: 2024-04-04

## 2024-04-04 RX ORDER — METHYLPREDNISOLONE 4 MG/1
TABLET ORAL
Qty: 21 TABLET | Refills: 0 | Status: SHIPPED | OUTPATIENT
Start: 2024-04-04 | End: 2024-04-09

## 2024-04-04 RX ADMIN — CYANOCOBALAMIN 1000 MCG: 1000 INJECTION, SOLUTION INTRAMUSCULAR; SUBCUTANEOUS at 14:39

## 2024-04-04 NOTE — PROGRESS NOTES
Answers submitted by the patient for this visit:  Primary Reason for Visit (Submitted on 2024)  What is the primary reason for your visit?: Other  Other (Submitted on 2024)  Please describe your symptoms.: Swelling & Pain in feet  Have you had these symptoms before?: Yes  How long have you been having these symptoms?: Greater than 2 weeks       Follow Up Office Visit      Date: 2024   Patient Name: Duncan Crouch  : 1965   MRN: [id number]     Chief Complaint:    Chief Complaint   Patient presents with    Foot Swelling     Both right and left feet swelling and aching    Blister     R foot       History of Present Illness: Duncan Crouch is a 58 y.o. male who is here today to follow up with pain and swelling in ankles and feet.  Only hurts when walking mostly, but now right one is acheing.  Taking colchicine.        Subjective      Past Medical History:   Diagnosis Date    Benign prostatic hyperplasia     Fractures     GERD (gastroesophageal reflux disease)     Gout        Past Surgical History:   Procedure Laterality Date    COLONOSCOPY         Family History   Problem Relation Age of Onset    Arthritis Mother         Social History     Socioeconomic History    Marital status: Single   Tobacco Use    Smoking status: Never    Smokeless tobacco: Never   Substance and Sexual Activity    Alcohol use: Yes     Alcohol/week: 5.0 standard drinks of alcohol     Types: 5 Drinks containing 0.5 oz of alcohol per week     Comment: 5    Drug use: Never    Sexual activity: Yes     Partners: Female     Birth control/protection: None         I have reviewed the patients family history, social history, past medical history, past surgical history and have updated it as appropriate.     Medications:     Current Outpatient Medications:     allopurinol (ZYLOPRIM) 100 MG tablet, TAKE 1 TABLET BY MOUTH DAILY, Disp: 30 tablet, Rfl: 2    Cholecalciferol (Vitamin D) 50 MCG ( UT) tablet, Take 2 tablets by  "mouth Daily., Disp: 60 tablet, Rfl: 5    colchicine 0.6 MG tablet, Take 1 tablet by mouth 2 (Two) Times a Day., Disp: 60 tablet, Rfl: 2    tadalafil (Cialis) 5 MG tablet, Take 1 tablet by mouth Daily As Needed for Erectile Dysfunction., Disp: 30 tablet, Rfl: 3    finasteride (Proscar) 5 MG tablet, Take 1 tablet by mouth Daily. (Patient not taking: Reported on 4/4/2024), Disp: 90 tablet, Rfl: 5    methylPREDNISolone (MEDROL) 4 MG dose pack, Take as directed on package instructions - 6 day taper., Disp: 21 tablet, Rfl: 0    naproxen (NAPROSYN) 500 MG tablet, Take 1 tablet by mouth 2 (Two) Times a Day As Needed (gout flare up)., Disp: 60 tablet, Rfl: 2    Current Facility-Administered Medications:     cyanocobalamin injection 1,000 mcg, 1,000 mcg, Intramuscular, Once, Marcos Allan DO    Allergies:   No Known Allergies    Objective       Vital Signs:   Vitals:    04/04/24 1359   BP: 130/82   BP Location: Left arm   Patient Position: Sitting   Cuff Size: Adult   Pulse: 76   Resp: 20   Temp: 98 °F (36.7 °C)   TempSrc: Tympanic   SpO2: 97%   Weight: 104 kg (230 lb)   Height: 180.3 cm (71\")     Body mass index is 32.08 kg/m².    Physical Exam:  Physical Exam  Constitutional:       General: He is not in acute distress.     Appearance: Normal appearance. He is not ill-appearing.   HENT:      Right Ear: Tympanic membrane normal.      Left Ear: Tympanic membrane and ear canal normal.      Nose: No congestion or rhinorrhea.      Mouth/Throat:      Mouth: Mucous membranes are moist.      Pharynx: No oropharyngeal exudate.   Eyes:      Extraocular Movements: Extraocular movements intact.      Conjunctiva/sclera: Conjunctivae normal.      Pupils: Pupils are equal, round, and reactive to light.   Cardiovascular:      Rate and Rhythm: Normal rate and regular rhythm.      Heart sounds: No murmur heard.  Pulmonary:      Effort: Pulmonary effort is normal. No respiratory distress.   Abdominal:      General: Abdomen is flat. Bowel " sounds are normal.      Palpations: Abdomen is soft.      Tenderness: There is no abdominal tenderness.   Musculoskeletal:      Right lower leg: Edema present.      Left lower leg: Edema present.   Skin:     General: Skin is warm and dry.      Findings: No rash.   Neurological:      General: No focal deficit present.      Mental Status: He is alert and oriented to person, place, and time. Mental status is at baseline.   Psychiatric:         Mood and Affect: Mood normal.         Behavior: Behavior normal.         Procedures    Results:       Assessment / Plan      Assessment/Plan:   Diagnoses and all orders for this visit:    1. Localized swelling of lower extremity (Primary)  -     naproxen (NAPROSYN) 500 MG tablet; Take 1 tablet by mouth 2 (Two) Times a Day As Needed (gout flare up).  Dispense: 60 tablet; Refill: 2  -     methylPREDNISolone (MEDROL) 4 MG dose pack; Take as directed on package instructions - 6 day taper.  Dispense: 21 tablet; Refill: 0  -     Uric acid    2. Edema of both lower extremities    3. B12 deficiency  -     cyanocobalamin injection 1,000 mcg    4. Enlarged prostate  -     Ambulatory Referral to Urology          Possible OA vs low level gout.         Follow Up:   No follow-ups on file.    Marcos Allan DO    [facility]

## 2024-04-05 DIAGNOSIS — M1A.0710 IDIOPATHIC CHRONIC GOUT OF RIGHT FOOT WITHOUT TOPHUS: Primary | ICD-10-CM

## 2024-04-05 LAB — URATE SERPL-MCNC: 8.3 MG/DL (ref 3.4–7)

## 2024-04-05 RX ORDER — ALLOPURINOL 300 MG/1
300 TABLET ORAL DAILY
Qty: 90 TABLET | Refills: 2 | Status: SHIPPED | OUTPATIENT
Start: 2024-04-05

## 2024-04-23 DIAGNOSIS — E55.9 VITAMIN D DEFICIENCY: ICD-10-CM

## 2024-04-23 DIAGNOSIS — R97.20 ELEVATED PSA: ICD-10-CM

## 2024-04-24 RX ORDER — FINASTERIDE 5 MG/1
5 TABLET, FILM COATED ORAL DAILY
Qty: 90 TABLET | Refills: 5 | Status: SHIPPED | OUTPATIENT
Start: 2024-04-24

## 2024-04-24 RX ORDER — CHOLECALCIFEROL (VITAMIN D3) 50 MCG
4000 TABLET ORAL DAILY
Qty: 60 TABLET | Refills: 5 | Status: SHIPPED | OUTPATIENT
Start: 2024-04-24

## 2024-04-29 ENCOUNTER — LAB (OUTPATIENT)
Facility: HOSPITAL | Age: 59
End: 2024-04-29
Payer: COMMERCIAL

## 2024-04-29 ENCOUNTER — OFFICE VISIT (OUTPATIENT)
Age: 59
End: 2024-04-29
Payer: COMMERCIAL

## 2024-04-29 VITALS
SYSTOLIC BLOOD PRESSURE: 140 MMHG | HEIGHT: 70 IN | WEIGHT: 234.6 LBS | OXYGEN SATURATION: 98 % | HEART RATE: 77 BPM | DIASTOLIC BLOOD PRESSURE: 81 MMHG | BODY MASS INDEX: 33.58 KG/M2

## 2024-04-29 DIAGNOSIS — R31.29 MICROHEMATURIA: ICD-10-CM

## 2024-04-29 DIAGNOSIS — R97.20 BPH WITH ELEVATED PSA: ICD-10-CM

## 2024-04-29 DIAGNOSIS — R97.20 ELEVATED PSA: ICD-10-CM

## 2024-04-29 DIAGNOSIS — R97.20 ELEVATED PSA: Primary | ICD-10-CM

## 2024-04-29 DIAGNOSIS — N40.0 BPH WITH ELEVATED PSA: ICD-10-CM

## 2024-04-29 LAB
BILIRUB BLD-MCNC: NEGATIVE MG/DL
BILIRUB UR QL STRIP: NEGATIVE
CLARITY UR: CLEAR
CLARITY, POC: CLEAR
COLOR UR: YELLOW
COLOR UR: YELLOW
EXPIRATION DATE: ABNORMAL
GLUCOSE UR STRIP-MCNC: NEGATIVE MG/DL
GLUCOSE UR STRIP-MCNC: NEGATIVE MG/DL
HGB UR QL STRIP.AUTO: NEGATIVE
KETONES UR QL STRIP: NEGATIVE
KETONES UR QL: NEGATIVE
LEUKOCYTE EST, POC: NEGATIVE
LEUKOCYTE ESTERASE UR QL STRIP.AUTO: NEGATIVE
Lab: ABNORMAL
NITRITE UR QL STRIP: NEGATIVE
NITRITE UR-MCNC: NEGATIVE MG/ML
PH UR STRIP.AUTO: 6.5 [PH] (ref 5–8)
PH UR: 6 [PH] (ref 5–8)
PROT UR QL STRIP: NEGATIVE
PROT UR STRIP-MCNC: NEGATIVE MG/DL
RBC # UR STRIP: ABNORMAL /UL
SP GR UR STRIP: 1.01 (ref 1–1.03)
SP GR UR: 1.01 (ref 1–1.03)
UROBILINOGEN UR QL STRIP: NORMAL
UROBILINOGEN UR QL: NORMAL

## 2024-04-29 PROCEDURE — 36415 COLL VENOUS BLD VENIPUNCTURE: CPT

## 2024-04-29 PROCEDURE — 81001 URINALYSIS AUTO W/SCOPE: CPT | Performed by: STUDENT IN AN ORGANIZED HEALTH CARE EDUCATION/TRAINING PROGRAM

## 2024-04-29 PROCEDURE — 82565 ASSAY OF CREATININE: CPT

## 2024-04-29 NOTE — PROGRESS NOTES
"       Office Visit New Urology      Patient Name: Duncan Crouch  : 1965   MRN: [id number]     Chief Complaint:    Chief Complaint   Patient presents with    Benign Prostatic Hypertrophy       Referring Provider: Marcos Allan DO    History of Present Illness: Duncan Crouch is a 58 y.o. male who presents to Urology today for evaluation of elevated PSA.    Lab Results   Component Value Date    PSA 6.490 (H) 2024    PSA 6.480 (H) 2023     The patient's PSA has been 6.49 on 2 subsequent occasions.  He has no family history of prostate cancer.  He had a grandfather who had \"prostate problems\" likely urinary related.  He denies significant urinary complaints.  He is not on alpha blockers.    Patient was noted to have trace blood on UA today.  Never smoker    Subjective      Review of System: Review of Systems   Genitourinary: Negative.  Negative for decreased urine volume, difficulty urinating, dysuria, enuresis, flank pain, frequency, hematuria and urgency.      I have reviewed the ROS documented by my clinical staff, I have updated appropriately and I agree. Feliz Dominguez MD    Past Medical History:   Past Medical History:   Diagnosis Date    Benign prostatic hyperplasia     Fractures     GERD (gastroesophageal reflux disease)     Gout        Past Surgical History:   Past Surgical History:   Procedure Laterality Date    COLONOSCOPY         Family History:   Family History   Problem Relation Age of Onset    Arthritis Mother        Social History:   Social History     Socioeconomic History    Marital status: Single   Tobacco Use    Smoking status: Never    Smokeless tobacco: Never   Vaping Use    Vaping status: Never Used   Substance and Sexual Activity    Alcohol use: Yes     Alcohol/week: 5.0 standard drinks of alcohol     Types: 5 Drinks containing 0.5 oz of alcohol per week     Comment: 5    Drug use: Never    Sexual activity: Yes     Partners: Female     Birth " control/protection: None       Medications:     Current Outpatient Medications:     allopurinol (ZYLOPRIM) 300 MG tablet, Take 1 tablet by mouth Daily., Disp: 90 tablet, Rfl: 2    Cholecalciferol (Vitamin D) 50 MCG (2000 UT) tablet, Take 2 tablets by mouth Daily., Disp: 60 tablet, Rfl: 5    colchicine 0.6 MG tablet, Take 1 tablet by mouth 2 (Two) Times a Day., Disp: 60 tablet, Rfl: 2    finasteride (Proscar) 5 MG tablet, Take 1 tablet by mouth Daily., Disp: 90 tablet, Rfl: 5    naproxen (NAPROSYN) 500 MG tablet, Take 1 tablet by mouth 2 (Two) Times a Day As Needed (gout flare up)., Disp: 60 tablet, Rfl: 2    tadalafil (Cialis) 10 MG tablet, Take 1 tablet by mouth Daily As Needed for Erectile Dysfunction., Disp: 30 tablet, Rfl: 0    Allergies:   No Known Allergies    IPSS Questionnaire (AUA-7):  Over the past month…    1)  Incomplete Emptying:       How often have you had a sensation of not emptying you had the sensation of not emptying your bladder completely after you finished urinating?  1 - Less than 1 time in 5   2)  Frequency:       How often have you had the urinate again less than two hours after you finished urinating?  3 - About half the time   3)  Intermittency:       How often have you found you stopped and started again several times when you urinated?   2 - Less than half the time   4) Urgency:      How often have you found it difficult to postpone urination?  3 - About half the time   5) Weak Stream:      How often have you had a weak urinary stream?  3 - About half the time   6) Straining:       How often have you had to push or strain to begin urination?  2 - Less than half the time   7) Nocturia:      How many times did you most typically get up to urinate from the time you went to bed at night until the time you got up in the morning?  0 - None   Total Score:  14   The International Prostate Symptom Score (IPSS) is used to screen, diagnose, track symptoms of benign prostatic hyperplasia (BPH).  "  0-7 (Mild Symptoms) 8-19 (Moderate) 20-35 (Severe)   Quality of Life (QoL):  If you were to spend the rest of your life with your urinary condition just the way it is now, how would you feel about that? 3-Mixed   Urine Leakage (Incontinence) 0-No Leakage         Post void residual bladder scan:   38ml    Objective     Physical Exam:   Vital Signs:   Vitals:    04/29/24 1428   BP: 140/81   Pulse: 77   SpO2: 98%   Weight: 106 kg (234 lb 9.6 oz)   Height: 177.8 cm (70\")     Body mass index is 33.66 kg/m².     Physical Exam  Vitals and nursing note reviewed.   Constitutional:       Appearance: Normal appearance.   HENT:      Head: Normocephalic and atraumatic.      Mouth/Throat:      Mouth: Mucous membranes are moist.      Pharynx: Oropharynx is clear.   Eyes:      Extraocular Movements: Extraocular movements intact.      Conjunctiva/sclera: Conjunctivae normal.   Cardiovascular:      Rate and Rhythm: Normal rate and regular rhythm.   Pulmonary:      Effort: Pulmonary effort is normal. No respiratory distress.   Abdominal:      Palpations: Abdomen is soft.      Tenderness: There is no abdominal tenderness. There is no right CVA tenderness or left CVA tenderness.   Genitourinary:     Comments: Circumcised phallus, orthotopic meatus, bilaterally descended testicles without masses, or lesions.     MOON: Normal rectal tone, no blood, estimated 35 gram prostate without nodularity or firmness.   Musculoskeletal:         General: Normal range of motion.      Cervical back: Normal range of motion.   Skin:     General: Skin is warm and dry.   Neurological:      General: No focal deficit present.      Mental Status: He is alert and oriented to person, place, and time.   Psychiatric:         Mood and Affect: Mood normal.         Behavior: Behavior normal.         Labs:   Brief Urine Lab Results  (Last result in the past 365 days)        Color   Clarity   Blood   Leuk Est   Nitrite   Protein   CREAT   Urine HCG        04/29/24 " 1434 Yellow   Clear   Trace   Negative   Negative   Negative                   Urine Culture          11/2/2023    13:39   Urine Culture   Urine Culture No growth         Lab Results   Component Value Date    GLUCOSE 90 11/02/2023    CALCIUM 9.6 11/02/2023     11/02/2023    K 4.0 11/02/2023    CO2 24.0 11/02/2023     11/02/2023    BUN 12 11/02/2023    CREATININE 1.20 11/02/2023    BCR 10.0 11/02/2023    ANIONGAP 12.0 11/02/2023       Lab Results   Component Value Date    WBC 6.50 11/02/2023    HGB 15.2 11/02/2023    HCT 44.2 11/02/2023    MCV 87.4 11/02/2023     11/02/2023       Images:   No Images in the past 120 days found..    Measures:   Tobacco:   Duncan Crouch  reports that he has never smoked. He has never used smokeless tobacco.       Assessment / Plan      Assessment/Plan:   Duncan Crouch is a 58 y.o. male who presented today for elevated PSA    I had a detailed discussion with the patient today regarding prostate-specific antigen (PSA) and prostate cancer screening.  We discussed that PSA is an imperfect screening test and that it can be elevated for a variety of reasons including infection, inflammation, as a function of increased prostate volume, and due to malignancy.  We discussed how prostate cancer is the most commonly diagnosed malignancy among men and becomes more prevalent at advanced age.  We discussed how patients with a family history of prostate cancer are at an increased risk of developing prostate cancer over the general population.      I counseled the patient that the American Urological Association guidelines recommend PSA screening in men aged 55 through 70, or in some instances at an earlier age if positive family history for prostate cancer.  I discussed how screening men older than 70 years old is typically not recommended, unless a patient has a greater than 10-year life expectancy, is in good health, and is personally motivated to rule out prostate  "cancer. This is due to the fact that most men older than 70 and in poor health, (and in those men with less than 10-year life expectancy) will likely die of causes unrelated to prostate cancer.      We talked about how prostate cancer is typically a slow-growing malignancy, but identifying intermediate or high risk prostate cancers that need prompt treatment is the ultimate goal of PSA and prostate cancer screening.  I discussed with this patient that there is no \"normal\" PSA range despite the fact that most labs indicate a \"normal\" PSA range from 0 to 4 ng/dL.  There are suggested age-adjusted PSA ranges that are also taken into account in the setting of slightly elevated PSA in the older male.  Besides age-adjusted ranges, calculating a patient's prostate volume to determine PSA density (<0.15 has a lower risk of harboring malignancy), calculating the patient's PSA velocity or doubling time, and evaluating free PSA versus total PSA values can help guide our decision making.      I also discussed the possibility of performing adjunctive tests for better risk characterization, my preference is to perform a [employer] Mdx urine screening score which can provide a percentage risk of harboring intermediate or high risk prostate cancers that may need treatment, based on expressed prostate cell genetics which can be analyzed on urine sample. This is beneficial in assisting with decision-making in patients with equivocal workup.      I also discussed my goal is to work-up the appropriate patient for elevated PSA as prostate biopsy and work-up for prostate cancer has inherent risks and potential harms.  The risk of prostate biopsy related sepsis is 2-4%.     In short, I discussed that PSA screening and work-up for prostate cancer should only ever occur after shared decision making conversation between the physician and patient.  Patient reports understanding.    Discussion summary  Follow-up in 2 to 3 weeks with MRI prostate " prior for further evaluation.  This may identify risk of prostate cancer at which point a fusion prostate MRI may be considered.    In addition, trace blood on UA needs further workup with microscopic evaluation.  If isolated hematuria is identified he will require CT urogram and cystoscopy for complete evaluation       Diagnoses and all orders for this visit:    1. Elevated PSA (Primary)  -     POC Urinalysis Dipstick, Automated  -     MRI Prostate With & Without Contrast; Future  -     Creatinine, Serum; Future    2. BPH with elevated PSA  -     POC Urinalysis Dipstick, Automated    3. Microhematuria  -     Urinalysis With Microscopic - Urine, Clean Catch; Future             Follow Up:   Return in about 3 weeks (around 5/20/2024).    I spent approximately 35 minutes providing clinical care for this patient; including review of patient's chart and provider documentation, face to face time spent with patient in examination room (obtaining history, performing physical exam, discussing diagnosis and management options), placing orders, and completing patient documentation.     Feliz Dominguez MD  [facility] [place]

## 2024-04-30 LAB
BACTERIA UR QL AUTO: ABNORMAL /HPF
CREAT SERPL-MCNC: 0.98 MG/DL (ref 0.76–1.27)
EGFRCR SERPLBLD CKD-EPI 2021: 88.8 ML/MIN/1.73
HYALINE CASTS UR QL AUTO: ABNORMAL /LPF
RBC # UR STRIP: ABNORMAL /HPF
REF LAB TEST METHOD: ABNORMAL
SQUAMOUS #/AREA URNS HPF: ABNORMAL /HPF
WBC # UR STRIP: ABNORMAL /HPF

## 2024-04-30 NOTE — PROGRESS NOTES
There is no obvious blood on microscopic urinalysis, good news.  No further workup for this is required.

## 2024-05-02 ENCOUNTER — PATIENT ROUNDING (BHMG ONLY) (OUTPATIENT)
Age: 59
End: 2024-05-02
Payer: COMMERCIAL

## 2024-05-02 NOTE — PROGRESS NOTES
A Silver[employer] message has been sent to the patient for PATIENT ROUNDING with [facility] [place].

## 2024-05-06 ENCOUNTER — LAB (OUTPATIENT)
Dept: LAB | Facility: HOSPITAL | Age: 59
End: 2024-05-06
Payer: COMMERCIAL

## 2024-05-06 ENCOUNTER — TRANSCRIBE ORDERS (OUTPATIENT)
Dept: LAB | Facility: HOSPITAL | Age: 59
End: 2024-05-06
Payer: COMMERCIAL

## 2024-05-06 DIAGNOSIS — K42.9 UMBILICAL HERNIA WITHOUT OBSTRUCTION OR GANGRENE: Primary | ICD-10-CM

## 2024-05-06 DIAGNOSIS — K42.9 UMBILICAL HERNIA WITHOUT OBSTRUCTION OR GANGRENE: ICD-10-CM

## 2024-05-06 LAB
ANION GAP SERPL CALCULATED.3IONS-SCNC: 9 MMOL/L (ref 5–15)
BUN SERPL-MCNC: 12 MG/DL (ref 6–20)
BUN/CREAT SERPL: 11.9 (ref 7–25)
CALCIUM SPEC-SCNC: 8.9 MG/DL (ref 8.6–10.5)
CHLORIDE SERPL-SCNC: 110 MMOL/L (ref 98–107)
CO2 SERPL-SCNC: 25 MMOL/L (ref 22–29)
CREAT SERPL-MCNC: 1.01 MG/DL (ref 0.76–1.27)
DEPRECATED RDW RBC AUTO: 41.7 FL (ref 37–54)
EGFRCR SERPLBLD CKD-EPI 2021: 85.7 ML/MIN/1.73
ERYTHROCYTE [DISTWIDTH] IN BLOOD BY AUTOMATED COUNT: 13 % (ref 12.3–15.4)
GLUCOSE SERPL-MCNC: 95 MG/DL (ref 65–99)
HCT VFR BLD AUTO: 40.5 % (ref 37.5–51)
HGB BLD-MCNC: 13.3 G/DL (ref 13–17.7)
MCH RBC QN AUTO: 28.7 PG (ref 26.6–33)
MCHC RBC AUTO-ENTMCNC: 32.8 G/DL (ref 31.5–35.7)
MCV RBC AUTO: 87.3 FL (ref 79–97)
PLATELET # BLD AUTO: 202 10*3/MM3 (ref 140–450)
PMV BLD AUTO: 12.3 FL (ref 6–12)
POTASSIUM SERPL-SCNC: 4.3 MMOL/L (ref 3.5–5.2)
RBC # BLD AUTO: 4.64 10*6/MM3 (ref 4.14–5.8)
SODIUM SERPL-SCNC: 144 MMOL/L (ref 136–145)
WBC NRBC COR # BLD AUTO: 5.4 10*3/MM3 (ref 3.4–10.8)

## 2024-05-06 PROCEDURE — 80048 BASIC METABOLIC PNL TOTAL CA: CPT

## 2024-05-06 PROCEDURE — 85027 COMPLETE CBC AUTOMATED: CPT | Performed by: SURGERY

## 2024-05-06 PROCEDURE — 36415 COLL VENOUS BLD VENIPUNCTURE: CPT

## 2024-05-09 ENCOUNTER — LAB REQUISITION (OUTPATIENT)
Dept: LAB | Facility: HOSPITAL | Age: 59
End: 2024-05-09
Payer: COMMERCIAL

## 2024-05-09 DIAGNOSIS — K42.9 UMBILICAL HERNIA WITHOUT OBSTRUCTION OR GANGRENE: ICD-10-CM

## 2024-05-09 PROCEDURE — 88302 TISSUE EXAM BY PATHOLOGIST: CPT | Performed by: SURGERY

## 2024-05-20 ENCOUNTER — OFFICE VISIT (OUTPATIENT)
Age: 59
End: 2024-05-20
Payer: COMMERCIAL

## 2024-05-20 VITALS
HEIGHT: 70 IN | OXYGEN SATURATION: 98 % | DIASTOLIC BLOOD PRESSURE: 93 MMHG | SYSTOLIC BLOOD PRESSURE: 151 MMHG | BODY MASS INDEX: 33.5 KG/M2 | HEART RATE: 71 BPM | WEIGHT: 234 LBS

## 2024-05-20 DIAGNOSIS — R39.9 LOWER URINARY TRACT SYMPTOMS (LUTS): ICD-10-CM

## 2024-05-20 DIAGNOSIS — R97.20 ELEVATED PROSTATE SPECIFIC ANTIGEN (PSA): Primary | ICD-10-CM

## 2024-05-20 PROCEDURE — 99214 OFFICE O/P EST MOD 30 MIN: CPT | Performed by: STUDENT IN AN ORGANIZED HEALTH CARE EDUCATION/TRAINING PROGRAM

## 2024-05-20 NOTE — PROGRESS NOTES
Follow Up Office Visit      Patient Name: Duncan Crouch  : 1965   MRN: [id number]     Chief Complaint:    Chief Complaint   Patient presents with    Elevated PSA       Referring Provider: No ref. provider found    History of Present Illness: Duncan Crouch is a 58 y.o. male who presents today for follow up of elevated PSA.  Patient's PSA amanda to 6.49.    Lab Results   Component Value Date    PSA 6.490 (H) 2024    PSA 6.480 (H) 2023     I saw him originally on 2024.    For further evaluation he underwent MRI prostate which is reviewed with him today.  This demonstrates a 49 g gland with 2 indeterminate foci in the transition zone measuring roughly 8 and 7 mm prospectively the peripheral zone has no obvious areas of abnormality.   He has mild baseline lower urinary tract symptoms with IPSS score 7 with a pleased quality of life.  He is not on alpha-blocker however he is apparently on finasteride monotherapy for presumed BPH.  He states he has been on finasteride for roughly 3 to 4 months.            MRI prostate reviewed demonstrating significant transitional zone nodularity consistent with BPH.    Subjective      Review of System: Review of Systems   Genitourinary: Negative.       I have reviewed the ROS documented by my clinical staff, I have updated appropriately and I agree. Feliz Dominguez MD    I have reviewed and the following portions of the patient's history were updated as appropriate: past family history, past medical history, past social history, past surgical history and problem list.    Medications:     Current Outpatient Medications:     allopurinol (ZYLOPRIM) 300 MG tablet, Take 1 tablet by mouth Daily., Disp: 90 tablet, Rfl: 2    Cholecalciferol (Vitamin D) 50 MCG ( UT) tablet, Take 2 tablets by mouth Daily., Disp: 60 tablet, Rfl: 5    colchicine 0.6 MG tablet, Take 1 tablet by mouth 2 (Two) Times a Day., Disp: 60 tablet, Rfl: 2    docusate sodium  (Colace) 100 MG capsule, Take 1 capsule by mouth 2 (Two) Times a Day., Disp: 30 capsule, Rfl: 0    finasteride (Proscar) 5 MG tablet, Take 1 tablet by mouth Daily., Disp: 90 tablet, Rfl: 5    naproxen (NAPROSYN) 500 MG tablet, Take 1 tablet by mouth 2 (Two) Times a Day As Needed (gout flare up)., Disp: 60 tablet, Rfl: 2    ondansetron (ZOFRAN) 4 MG tablet, Take 1 tablet by mouth Every 6 (Six) Hours As Needed., Disp: 12 tablet, Rfl: 0    oxyCODONE-acetaminophen (Percocet) 5-325 MG per tablet, Take 1 tablet by mouth Every 4 (Four) to 6 (Six) Hours As Needed for pain., Disp: 12 tablet, Rfl: 0    tadalafil (Cialis) 10 MG tablet, Take 1 tablet by mouth Daily As Needed for Erectile Dysfunction., Disp: 30 tablet, Rfl: 0    Allergies:   No Known Allergies    IPSS Questionnaire (AUA-7):  Over the past month…    1)  Incomplete Emptying:       How often have you had a sensation of not emptying you had the sensation of not emptying your bladder completely after you finished urinating?  0 - Not at all   2)  Frequency:       How often have you had the urinate again less than two hours after you finished urinating?  2 - Less than half the time   3)  Intermittency:       How often have you found you stopped and started again several times when you urinated?   1 - Less than 1 time in 5   4) Urgency:      How often have you found it difficult to postpone urination?  2 - Less than half the time   5) Weak Stream:      How often have you had a weak urinary stream?  1 - Less than 1 time in 5   6) Straining:       How often have you had to push or strain to begin urination?  1 - Less than 1 time in 5   7) Nocturia:      How many times did you most typically get up to urinate from the time you went to bed at night until the time you got up in the morning?  0 - None   Total Score:  7   The International Prostate Symptom Score (IPSS) is used to screen, diagnose, track symptoms of benign prostatic hyperplasia (BPH).   0-7 (Mild Symptoms) 8-19  "(Moderate) 20-35 (Severe)   Quality of Life (QoL):  If you were to spend the rest of your life with your urinary condition just the way it is now, how would you feel about that? 1-Pleased   Urine Leakage (Incontinence) 0-No Leakage       Objective     Physical Exam:   Vital Signs:   Vitals:    05/20/24 1424   BP: 151/93   Pulse: 71   SpO2: 98%   Weight: 106 kg (234 lb)   Height: 177.8 cm (70\")     Body mass index is 33.58 kg/m².     Physical Exam  Constitutional:       Appearance: Normal appearance.   HENT:      Head: Normocephalic and atraumatic.      Nose: Nose normal.   Eyes:      Extraocular Movements: Extraocular movements intact.      Conjunctiva/sclera: Conjunctivae normal.      Pupils: Pupils are equal, round, and reactive to light.   Musculoskeletal:         General: Normal range of motion.      Cervical back: Normal range of motion and neck supple.   Skin:     General: Skin is warm and dry.      Findings: No lesion or rash.   Neurological:      General: No focal deficit present.      Mental Status: He is alert and oriented to person, place, and time. Mental status is at baseline.   Psychiatric:         Mood and Affect: Mood normal.         Behavior: Behavior normal.         Labs:   Brief Urine Lab Results  (Last result in the past 365 days)        Color   Clarity   Blood   Leuk Est   Nitrite   Protein   CREAT   Urine HCG        04/29/24 1543 Yellow   Clear   Negative   Negative   Negative   Negative                   Urine Culture          11/2/2023    13:39   Urine Culture   Urine Culture No growth         Lab Results   Component Value Date    GLUCOSE 95 05/06/2024    CALCIUM 8.9 05/06/2024     05/06/2024    K 4.3 05/06/2024    CO2 25.0 05/06/2024     (H) 05/06/2024    BUN 12 05/06/2024    CREATININE 1.01 05/06/2024    BCR 11.9 05/06/2024    ANIONGAP 9.0 05/06/2024       Lab Results   Component Value Date    WBC 5.40 05/06/2024    HGB 13.3 05/06/2024    HCT 40.5 05/06/2024    MCV 87.3 " 05/06/2024     05/06/2024       Images:   MR prostate without & with IV contrast    Result Date: 5/14/2024  2 indeterminate foci in the transition zone as described. PI-RADS 3 - Intermediate (the presence of clinically significant cancer is equivocal). Images reviewed, interpreted, and dictated by Romeo Novoa MD      Measures:   Tobacco:   Duncan Crouch  reports that he has never smoked. He has never used smokeless tobacco.       Assessment / Plan      Assessment/Plan:   58 y.o. male who presented today for follow up of elevated PSA.  MRI prostate demonstrating 2 indeterminate lesions in the transition zone.  I discussed with the patient that he does have a mildly enlarged prostate.  His PSA may be elevated secondary to a smoldering prostatitis or simply related to BPH.  We discussed the only way to truly diagnose prostate cancer is with biopsy.  I discussed the patient that most transition zone lesions that are biopsied come back benign BPH nodules.  We discussed potential risks of prostate biopsy which include sepsis 3% risk despite pretreatment with antibiotic, blood in urine, stool, ejaculate, pelvic pain etc.  Prior to consideration of a prostate biopsy I discussed the option for adjunct testing.  The patient is amenable to further testing prior to consideration of biopsy.  I would like to perform select MDX urine screening test to better accurately characterize his risk of undiagnosed malignancy.  If this comes back intermediate risk or high risk I will recommend a fusion prostate biopsy in the operating room.  Risks of this have been thoroughly discussed and he is amenable to this plan.  I will call him with select MDX screening and next steps.    Diagnoses and all orders for this visit:    1. Elevated prostate specific antigen (PSA) (Primary)    2. Lower urinary tract symptoms (LUTS)           Follow Up:   No follow-ups on file.    I spent approximately 30 minutes providing clinical care for  this patient; including review of patient's chart and provider documentation, face to face time spent with patient in examination room (obtaining history, performing physical exam, discussing diagnosis and management options), placing orders, and completing patient documentation.     Feliz Dominguez MD  [facility] [place]

## 2024-05-28 ENCOUNTER — TELEPHONE (OUTPATIENT)
Dept: UROLOGY | Facility: CLINIC | Age: 59
End: 2024-05-28
Payer: COMMERCIAL

## 2024-05-28 DIAGNOSIS — R97.20 ELEVATED PROSTATE SPECIFIC ANTIGEN (PSA): Primary | ICD-10-CM

## 2024-05-29 ENCOUNTER — TELEPHONE (OUTPATIENT)
Dept: UROLOGY | Facility: CLINIC | Age: 59
End: 2024-05-29
Payer: COMMERCIAL

## 2024-05-29 NOTE — TELEPHONE ENCOUNTER
"Relay     \"# on file is turned off.. Sent patient message through [employer] regarding, 6 month follow up with a repeat PSA prior appointment NEEDING SCHEDULED w/ Dr. Dominguez -- HUB ok to schedule if patient calls back in..  \"                 "

## 2024-07-12 DIAGNOSIS — E55.9 VITAMIN D DEFICIENCY: ICD-10-CM

## 2024-07-12 RX ORDER — CHOLECALCIFEROL (VITAMIN D3) 50 MCG
4000 TABLET ORAL DAILY
Qty: 60 TABLET | Refills: 5 | OUTPATIENT
Start: 2024-07-12

## 2024-08-15 ENCOUNTER — OFFICE VISIT (OUTPATIENT)
Dept: INTERNAL MEDICINE | Facility: CLINIC | Age: 59
End: 2024-08-15
Payer: COMMERCIAL

## 2024-08-15 VITALS
SYSTOLIC BLOOD PRESSURE: 130 MMHG | HEIGHT: 70 IN | HEART RATE: 76 BPM | OXYGEN SATURATION: 97 % | DIASTOLIC BLOOD PRESSURE: 80 MMHG | TEMPERATURE: 98.2 F | BODY MASS INDEX: 32.64 KG/M2 | WEIGHT: 228 LBS

## 2024-08-15 DIAGNOSIS — N52.9 ERECTILE DYSFUNCTION, UNSPECIFIED ERECTILE DYSFUNCTION TYPE: Primary | ICD-10-CM

## 2024-08-15 DIAGNOSIS — R97.20 ELEVATED PSA: ICD-10-CM

## 2024-08-15 DIAGNOSIS — E55.9 VITAMIN D DEFICIENCY: ICD-10-CM

## 2024-08-15 DIAGNOSIS — M1A.0710 IDIOPATHIC CHRONIC GOUT OF RIGHT FOOT WITHOUT TOPHUS: ICD-10-CM

## 2024-08-15 PROBLEM — N40.0 BPH (BENIGN PROSTATIC HYPERPLASIA): Status: ACTIVE | Noted: 2024-08-15

## 2024-08-15 PROCEDURE — 99214 OFFICE O/P EST MOD 30 MIN: CPT | Performed by: INTERNAL MEDICINE

## 2024-08-15 RX ORDER — TADALAFIL 20 MG/1
20 TABLET ORAL DAILY PRN
Qty: 30 TABLET | Refills: 3 | Status: SHIPPED | OUTPATIENT
Start: 2024-08-15

## 2024-08-15 RX ORDER — CHOLECALCIFEROL (VITAMIN D3) 50 MCG
4000 TABLET ORAL DAILY
Qty: 60 TABLET | Refills: 5 | Status: SHIPPED | OUTPATIENT
Start: 2024-08-15

## 2024-08-15 RX ORDER — COLCHICINE 0.6 MG/1
0.6 TABLET ORAL 2 TIMES DAILY
Qty: 60 TABLET | Refills: 2 | Status: SHIPPED | OUTPATIENT
Start: 2024-08-15

## 2024-08-15 NOTE — PROGRESS NOTES
Answers submitted by the patient for this visit:  Primary Reason for Visit (Submitted on 2024)  What is the primary reason for your visit?: Other  Other (Submitted on 2024)  Please describe your symptoms.: no symptoms  Have you had these symptoms before?: No  How long have you been having these symptoms?: 1-4 days       Follow Up Office Visit      Date: 08/15/2024   Patient Name: Duncan Crouch  : 1965   MRN: [id number]     Chief Complaint:    Chief Complaint   Patient presents with    Follow-up       History of Present Illness: Duncan Crouch is a 58 y.o. male who is here today to follow up with f/u ED      Subjective      Past Medical History:   Diagnosis Date    Benign prostatic hyperplasia     Fractures     GERD (gastroesophageal reflux disease)     Gout        Past Surgical History:   Procedure Laterality Date    COLONOSCOPY         Family History   Problem Relation Age of Onset    Arthritis Mother     Cancer Paternal Grandmother         Colon Cancer        Social History     Socioeconomic History    Marital status: Single   Tobacco Use    Smoking status: Never    Smokeless tobacco: Never   Vaping Use    Vaping status: Never Used   Substance and Sexual Activity    Alcohol use: Yes     Alcohol/week: 5.0 standard drinks of alcohol     Types: 5 Drinks containing 0.5 oz of alcohol per week     Comment: 5    Drug use: Never    Sexual activity: Yes     Partners: Female     Birth control/protection: None         I have reviewed the patients family history, social history, past medical history, past surgical history and have updated it as appropriate.     Medications:     Current Outpatient Medications:     Cholecalciferol (Vitamin D) 50 MCG ( UT) tablet, Take 2 tablets by mouth Daily., Disp: 60 tablet, Rfl: 5    colchicine 0.6 MG tablet, Take 1 tablet by mouth 2 (Two) Times a Day. Take 1.2 mg at first sign of flare, followed by 0.6 mg  after one hour.  Then 0.6mg twice a day the following  "day until resolves., Disp: 60 tablet, Rfl: 2    finasteride (Proscar) 5 MG tablet, Take 1 tablet by mouth Daily., Disp: 90 tablet, Rfl: 5    naproxen (NAPROSYN) 500 MG tablet, Take 1 tablet by mouth 2 (Two) Times a Day As Needed (gout flare up)., Disp: 60 tablet, Rfl: 2    ondansetron (ZOFRAN) 4 MG tablet, Take 1 tablet by mouth Every 6 (Six) Hours As Needed., Disp: 12 tablet, Rfl: 0    tadalafil (Cialis) 20 MG tablet, Take 1 tablet by mouth Daily As Needed for Erectile Dysfunction., Disp: 30 tablet, Rfl: 3    Allergies:   No Known Allergies    Objective       Vital Signs:   Vitals:    08/15/24 1051   BP: 130/80   BP Location: Left arm   Patient Position: Sitting   Cuff Size: Adult   Pulse: 76   Temp: 98.2 °F (36.8 °C)   SpO2: 97%   Weight: 103 kg (228 lb)   Height: 177.8 cm (70\")     Body mass index is 32.71 kg/m².    Physical Exam:  Physical Exam    Procedures    Results:       Assessment / Plan      Assessment/Plan:   Diagnoses and all orders for this visit:    1. Erectile dysfunction, unspecified erectile dysfunction type (Primary)  -     tadalafil (Cialis) 20 MG tablet; Take 1 tablet by mouth Daily As Needed for Erectile Dysfunction.  Dispense: 30 tablet; Refill: 3    2. Idiopathic chronic gout of right foot without tophus  -     colchicine 0.6 MG tablet; Take 1 tablet by mouth 2 (Two) Times a Day. Take 1.2 mg at first sign of flare, followed by 0.6 mg  after one hour.  Then 0.6mg twice a day the following day until resolves.  Dispense: 60 tablet; Refill: 2    3. Vitamin D deficiency  -     Cholecalciferol (Vitamin D) 50 MCG (2000 UT) tablet; Take 2 tablets by mouth Daily.  Dispense: 60 tablet; Refill: 5    4. Elevated PSA          ED uncontrolled but slighty improved with meds, increase dose. If no improvement adivsed to discuss further workup with Dr. Doimnguez.         Gout controlled, no longer on allopurinol.     Refill vit D    BPH likely causing psa elevation. Urinating well. Continue proscar.   Follow " Up:   No follow-ups on file.    DO CARMELA Whyte

## 2024-09-10 DIAGNOSIS — M1A.0710 IDIOPATHIC CHRONIC GOUT OF RIGHT FOOT WITHOUT TOPHUS: ICD-10-CM

## 2024-09-10 DIAGNOSIS — R97.20 ELEVATED PSA: ICD-10-CM

## 2024-09-10 RX ORDER — COLCHICINE 0.6 MG/1
0.6 TABLET ORAL 2 TIMES DAILY
Qty: 60 TABLET | Refills: 2 | Status: SHIPPED | OUTPATIENT
Start: 2024-09-10

## 2024-09-10 RX ORDER — FINASTERIDE 5 MG/1
5 TABLET, FILM COATED ORAL DAILY
Qty: 90 TABLET | Refills: 5 | Status: SHIPPED | OUTPATIENT
Start: 2024-09-10

## 2024-10-14 DIAGNOSIS — M79.89 LOCALIZED SWELLING OF LOWER EXTREMITY: ICD-10-CM

## 2024-10-14 RX ORDER — NAPROXEN 500 MG/1
500 TABLET ORAL 2 TIMES DAILY PRN
Qty: 60 TABLET | Refills: 2 | Status: SHIPPED | OUTPATIENT
Start: 2024-10-14

## 2024-10-28 ENCOUNTER — TELEPHONE (OUTPATIENT)
Dept: INTERNAL MEDICINE | Facility: CLINIC | Age: 59
End: 2024-10-28

## 2024-10-28 DIAGNOSIS — M1A.0710 IDIOPATHIC CHRONIC GOUT OF RIGHT FOOT WITHOUT TOPHUS: Primary | ICD-10-CM

## 2024-10-28 RX ORDER — ALLOPURINOL 100 MG/1
100 TABLET ORAL DAILY
Qty: 90 TABLET | Refills: 2 | Status: SHIPPED | OUTPATIENT
Start: 2024-10-28

## 2024-10-28 NOTE — TELEPHONE ENCOUNTER
Caller: Duncan Crouch    Relationship: Self    Best call back number:      [phone] (Home)     Requested Prescriptions:     ALLOPURINOL - 100 MG     IT WAS NOTED MEDICATION IS NOT INCLUDED ON PATIENT'S CHART    Pharmacy where request should be sent:     [id number] [address]  [phone]  - [phone] FX     Last office visit with prescribing clinician: 8/15/2024   Last telemedicine visit with prescribing clinician: Visit date not found   Next office visit with prescribing clinician: 2/17/2025     Additional details provided by patient:     PATIENT STATED HE IS OUT OF THE MEDICATION    Does the patient have less than a 3 day supply:  [x] Yes  [] No    Would you like a call back once the refill request has been completed: [] Yes [] No    If the office needs to give you a call back, can they leave a voicemail: [] Yes [] No    Tim Razo Rep   10/28/24 13:49 EDT

## 2024-11-25 DIAGNOSIS — N52.9 ERECTILE DYSFUNCTION, UNSPECIFIED ERECTILE DYSFUNCTION TYPE: ICD-10-CM

## 2024-11-25 DIAGNOSIS — E55.9 VITAMIN D DEFICIENCY: ICD-10-CM

## 2024-11-25 RX ORDER — CHOLECALCIFEROL (VITAMIN D3) 50 MCG
4000 TABLET ORAL DAILY
Qty: 60 TABLET | Refills: 5 | Status: SHIPPED | OUTPATIENT
Start: 2024-11-25

## 2024-11-25 RX ORDER — TADALAFIL 20 MG/1
20 TABLET ORAL DAILY PRN
Qty: 30 TABLET | Refills: 3 | Status: SHIPPED | OUTPATIENT
Start: 2024-11-25

## 2024-11-26 ENCOUNTER — TELEPHONE (OUTPATIENT)
Dept: GASTROENTEROLOGY | Facility: CLINIC | Age: 59
End: 2024-11-26
Payer: COMMERCIAL

## 2024-11-26 RX ORDER — SODIUM PICOSULFATE, MAGNESIUM OXIDE, AND ANHYDROUS CITRIC ACID 12; 3.5; 1 G/175ML; G/175ML; MG/175ML
350 LIQUID ORAL TAKE AS DIRECTED
Qty: 350 ML | Refills: 0 | Status: SHIPPED | OUTPATIENT
Start: 2024-11-26 | End: 2024-11-26 | Stop reason: SDUPTHER

## 2024-11-26 NOTE — TELEPHONE ENCOUNTER
PT HAS BEEN ADDED TO DR TINEO ON 12/4 FOR A COLON, CAN A PREP BE SENT TO THE PHARMACY?    [facility] #[id number] [facility] [address]

## 2024-12-04 ENCOUNTER — OUTSIDE FACILITY SERVICE (OUTPATIENT)
Dept: GASTROENTEROLOGY | Facility: CLINIC | Age: 59
End: 2024-12-04
Payer: COMMERCIAL

## 2024-12-04 PROCEDURE — 45385 COLONOSCOPY W/LESION REMOVAL: CPT | Performed by: INTERNAL MEDICINE

## 2024-12-04 PROCEDURE — 88305 TISSUE EXAM BY PATHOLOGIST: CPT | Performed by: INTERNAL MEDICINE

## 2024-12-04 PROCEDURE — 45388 COLONOSCOPY W/ABLATION: CPT | Performed by: INTERNAL MEDICINE

## 2024-12-05 ENCOUNTER — LAB REQUISITION (OUTPATIENT)
Dept: LAB | Facility: HOSPITAL | Age: 59
End: 2024-12-05
Payer: COMMERCIAL

## 2024-12-05 DIAGNOSIS — K64.8 OTHER HEMORRHOIDS: ICD-10-CM

## 2024-12-05 DIAGNOSIS — D12.5 BENIGN NEOPLASM OF SIGMOID COLON: ICD-10-CM

## 2024-12-05 DIAGNOSIS — Z86.0100 PERSONAL HISTORY OF COLON POLYPS, UNSPECIFIED: ICD-10-CM

## 2024-12-05 DIAGNOSIS — D12.1 BENIGN NEOPLASM OF APPENDIX: ICD-10-CM

## 2024-12-06 LAB — REF LAB TEST METHOD: NORMAL

## 2025-01-08 ENCOUNTER — LAB (OUTPATIENT)
Dept: INTERNAL MEDICINE | Facility: CLINIC | Age: 60
End: 2025-01-08
Payer: COMMERCIAL

## 2025-01-08 ENCOUNTER — OFFICE VISIT (OUTPATIENT)
Dept: INTERNAL MEDICINE | Facility: CLINIC | Age: 60
End: 2025-01-08
Payer: COMMERCIAL

## 2025-01-08 VITALS
DIASTOLIC BLOOD PRESSURE: 78 MMHG | WEIGHT: 239 LBS | BODY MASS INDEX: 34.22 KG/M2 | OXYGEN SATURATION: 97 % | SYSTOLIC BLOOD PRESSURE: 140 MMHG | HEART RATE: 84 BPM | HEIGHT: 70 IN | TEMPERATURE: 97.2 F

## 2025-01-08 DIAGNOSIS — M10.041 ACUTE IDIOPATHIC GOUT OF RIGHT HAND: Primary | ICD-10-CM

## 2025-01-08 DIAGNOSIS — R97.20 ELEVATED PSA MEASUREMENT: ICD-10-CM

## 2025-01-08 LAB
BASOPHILS # BLD AUTO: 0.04 10*3/MM3 (ref 0–0.2)
BASOPHILS NFR BLD AUTO: 0.5 % (ref 0–1.5)
DEPRECATED RDW RBC AUTO: 38.2 FL (ref 37–54)
EOSINOPHIL # BLD AUTO: 0.07 10*3/MM3 (ref 0–0.4)
EOSINOPHIL NFR BLD AUTO: 0.9 % (ref 0.3–6.2)
ERYTHROCYTE [DISTWIDTH] IN BLOOD BY AUTOMATED COUNT: 12.6 % (ref 12.3–15.4)
ERYTHROCYTE [SEDIMENTATION RATE] IN BLOOD: 12 MM/HR (ref 0–20)
HCT VFR BLD AUTO: 42.4 % (ref 37.5–51)
HGB BLD-MCNC: 14.4 G/DL (ref 13–17.7)
IMM GRANULOCYTES # BLD AUTO: 0.05 10*3/MM3 (ref 0–0.05)
IMM GRANULOCYTES NFR BLD AUTO: 0.6 % (ref 0–0.5)
LYMPHOCYTES # BLD AUTO: 3.04 10*3/MM3 (ref 0.7–3.1)
LYMPHOCYTES NFR BLD AUTO: 39.2 % (ref 19.6–45.3)
MCH RBC QN AUTO: 28.8 PG (ref 26.6–33)
MCHC RBC AUTO-ENTMCNC: 34 G/DL (ref 31.5–35.7)
MCV RBC AUTO: 84.8 FL (ref 79–97)
MONOCYTES # BLD AUTO: 0.64 10*3/MM3 (ref 0.1–0.9)
MONOCYTES NFR BLD AUTO: 8.3 % (ref 5–12)
NEUTROPHILS NFR BLD AUTO: 3.91 10*3/MM3 (ref 1.7–7)
NEUTROPHILS NFR BLD AUTO: 50.5 % (ref 42.7–76)
NRBC BLD AUTO-RTO: 0 /100 WBC (ref 0–0.2)
PLATELET # BLD AUTO: 211 10*3/MM3 (ref 140–450)
PMV BLD AUTO: 11.7 FL (ref 6–12)
RBC # BLD AUTO: 5 10*6/MM3 (ref 4.14–5.8)
WBC NRBC COR # BLD AUTO: 7.75 10*3/MM3 (ref 3.4–10.8)

## 2025-01-08 PROCEDURE — 84550 ASSAY OF BLOOD/URIC ACID: CPT | Performed by: INTERNAL MEDICINE

## 2025-01-08 PROCEDURE — 96372 THER/PROPH/DIAG INJ SC/IM: CPT | Performed by: INTERNAL MEDICINE

## 2025-01-08 PROCEDURE — 36415 COLL VENOUS BLD VENIPUNCTURE: CPT | Performed by: INTERNAL MEDICINE

## 2025-01-08 PROCEDURE — 80053 COMPREHEN METABOLIC PANEL: CPT | Performed by: INTERNAL MEDICINE

## 2025-01-08 PROCEDURE — 85025 COMPLETE CBC W/AUTO DIFF WBC: CPT | Performed by: INTERNAL MEDICINE

## 2025-01-08 PROCEDURE — 99213 OFFICE O/P EST LOW 20 MIN: CPT | Performed by: INTERNAL MEDICINE

## 2025-01-08 PROCEDURE — 84153 ASSAY OF PSA TOTAL: CPT | Performed by: INTERNAL MEDICINE

## 2025-01-08 PROCEDURE — 85652 RBC SED RATE AUTOMATED: CPT | Performed by: INTERNAL MEDICINE

## 2025-01-08 PROCEDURE — 86140 C-REACTIVE PROTEIN: CPT | Performed by: INTERNAL MEDICINE

## 2025-01-08 PROCEDURE — 84145 PROCALCITONIN (PCT): CPT | Performed by: INTERNAL MEDICINE

## 2025-01-08 RX ORDER — COLCHICINE 0.6 MG/1
0.6 TABLET ORAL 2 TIMES DAILY
Qty: 60 TABLET | Refills: 2 | Status: SHIPPED | OUTPATIENT
Start: 2025-01-08

## 2025-01-08 RX ORDER — INDOMETHACIN 50 MG/1
50 CAPSULE ORAL 3 TIMES DAILY PRN
Qty: 90 CAPSULE | Refills: 2 | Status: SHIPPED | OUTPATIENT
Start: 2025-01-08

## 2025-01-08 RX ORDER — TRIAMCINOLONE ACETONIDE 40 MG/ML
40 INJECTION, SUSPENSION INTRA-ARTICULAR; INTRAMUSCULAR ONCE
Status: COMPLETED | OUTPATIENT
Start: 2025-01-08 | End: 2025-01-08

## 2025-01-08 RX ADMIN — TRIAMCINOLONE ACETONIDE 40 MG: 40 INJECTION, SUSPENSION INTRA-ARTICULAR; INTRAMUSCULAR at 13:33

## 2025-01-08 NOTE — PROGRESS NOTES
Follow Up Office Visit      Date: 2025   Patient Name: Duncan Crouch  : 1965   MRN: [id number]     Chief Complaint:    Chief Complaint   Patient presents with    Hand Pain       History of Present Illness: Duncan Crouch is a 59 y.o. male who is here today to follow up with hand pain.  Right hand pain since yesterday. Right thumb swollen and red. Suspecting gout.  No fever.        Subjective      Past Medical History:   Diagnosis Date    Benign prostatic hyperplasia     Fractures     GERD (gastroesophageal reflux disease)     Gout        Past Surgical History:   Procedure Laterality Date    COLONOSCOPY         Family History   Problem Relation Age of Onset    Arthritis Mother     Cancer Paternal Grandmother         Colon Cancer        Social History     Socioeconomic History    Marital status: Single   Tobacco Use    Smoking status: Never    Smokeless tobacco: Never   Vaping Use    Vaping status: Never Used   Substance and Sexual Activity    Alcohol use: Yes     Alcohol/week: 5.0 standard drinks of alcohol     Types: 5 Drinks containing 0.5 oz of alcohol per week     Comment: 5    Drug use: Never    Sexual activity: Yes     Partners: Female     Birth control/protection: None         I have reviewed the patients family history, social history, past medical history, past surgical history and have updated it as appropriate.     Medications:     Current Outpatient Medications:     allopurinol (ZYLOPRIM) 100 MG tablet, Take 1 tablet by mouth Daily., Disp: 90 tablet, Rfl: 2    Cholecalciferol (Vitamin D) 50 MCG ( UT) tablet, Take 2 tablets by mouth Daily., Disp: 60 tablet, Rfl: 5    colchicine 0.6 MG tablet, Take 1 tablet by mouth 2 (Two) Times a Day. Take 1.2 mg at first sign of flare, followed by 0.6 mg  after one hour.  Then 0.6mg twice a day the following day until resolves., Disp: 60 tablet, Rfl: 2    finasteride (Proscar) 5 MG tablet, Take 1 tablet by mouth Daily., Disp: 90 tablet,  "Rfl: 5    naproxen (NAPROSYN) 500 MG tablet, Take 1 tablet by mouth 2 (Two) Times a Day As Needed (gout flare up)., Disp: 60 tablet, Rfl: 2    ondansetron (ZOFRAN) 4 MG tablet, Take 1 tablet by mouth Every 6 (Six) Hours As Needed., Disp: 12 tablet, Rfl: 0    tadalafil (Cialis) 20 MG tablet, Take 1 tablet by mouth Daily As Needed for Erectile Dysfunction., Disp: 30 tablet, Rfl: 3    indomethacin (INDOCIN) 50 MG capsule, Take 1 capsule by mouth 3 (Three) Times a Day As Needed (Gout flare up)., Disp: 90 capsule, Rfl: 2    Current Facility-Administered Medications:     triamcinolone acetonide (KENALOG-40) injection 40 mg, 40 mg, Intramuscular, Once, Marcos Allan DO    Allergies:   No Known Allergies    Objective       Vital Signs:   Vitals:    01/08/25 1141   BP: 140/78   BP Location: Left arm   Patient Position: Sitting   Cuff Size: Large Adult   Pulse: 84   Temp: 97.2 °F (36.2 °C)   TempSrc: Tympanic   SpO2: 97%   Weight: 108 kg (239 lb)   Height: 177.8 cm (70\")     Body mass index is 34.29 kg/m².    Physical Exam:  Physical Exam  Constitutional:       General: He is not in acute distress.     Appearance: Normal appearance. He is not ill-appearing.   HENT:      Nose: No congestion or rhinorrhea.      Mouth/Throat:      Mouth: Mucous membranes are moist.      Pharynx: No oropharyngeal exudate.   Eyes:      Extraocular Movements: Extraocular movements intact.      Conjunctiva/sclera: Conjunctivae normal.      Pupils: Pupils are equal, round, and reactive to light.   Pulmonary:      Effort: Pulmonary effort is normal. No respiratory distress.   Musculoskeletal:      Right lower leg: No edema.      Left lower leg: No edema.   Skin:     General: Skin is warm and dry.      Findings: No rash.      Comments: Thumb is swollen, red, warm to the touch, tender   Neurological:      General: No focal deficit present.      Mental Status: He is alert and oriented to person, place, and time. Mental status is at baseline. "   Psychiatric:         Mood and Affect: Mood normal.         Behavior: Behavior normal.         Procedures    Results:       Assessment / Plan      Assessment/Plan:   Diagnoses and all orders for this visit:    1. Acute idiopathic gout of right hand (Primary)  -     CBC w AUTO Differential  -     C-reactive protein  -     Comprehensive metabolic panel  -     Sedimentation Rate  -     Procalcitonin  -     Uric acid  -     indomethacin (INDOCIN) 50 MG capsule; Take 1 capsule by mouth 3 (Three) Times a Day As Needed (Gout flare up).  Dispense: 90 capsule; Refill: 2  -     colchicine 0.6 MG tablet; Take 1 tablet by mouth 2 (Two) Times a Day. Take 1.2 mg at first sign of flare, followed by 0.6 mg  after one hour.  Then 0.6mg twice a day the following day until resolves.  Dispense: 60 tablet; Refill: 2  -     triamcinolone acetonide (KENALOG-40) injection 40 mg         BMI is >= 30 and <35. (Class 1 Obesity). The following options were offered after discussion;: weight loss educational material (shared in after visit summary)       Follow Up:   No follow-ups on file.    Marcos Allan DO    [facility]

## 2025-01-09 LAB
ALBUMIN SERPL-MCNC: 4.2 G/DL (ref 3.5–5.2)
ALBUMIN/GLOB SERPL: 1.3 G/DL
ALP SERPL-CCNC: 102 U/L (ref 39–117)
ALT SERPL W P-5'-P-CCNC: 27 U/L (ref 1–41)
ANION GAP SERPL CALCULATED.3IONS-SCNC: 11.3 MMOL/L (ref 5–15)
AST SERPL-CCNC: 30 U/L (ref 1–40)
BILIRUB SERPL-MCNC: 0.5 MG/DL (ref 0–1.2)
BUN SERPL-MCNC: 10 MG/DL (ref 8–23)
BUN/CREAT SERPL: 9.6 (ref 7–25)
CALCIUM SPEC-SCNC: 9.1 MG/DL (ref 8.6–10.5)
CHLORIDE SERPL-SCNC: 104 MMOL/L (ref 98–107)
CO2 SERPL-SCNC: 24.7 MMOL/L (ref 22–29)
CREAT SERPL-MCNC: 1.04 MG/DL (ref 0.76–1.27)
CRP SERPL-MCNC: 1.87 MG/DL (ref 0–0.5)
EGFRCR SERPLBLD CKD-EPI 2021: 82.2 ML/MIN/1.73
GLOBULIN UR ELPH-MCNC: 3.2 GM/DL
GLUCOSE SERPL-MCNC: 78 MG/DL (ref 65–99)
POTASSIUM SERPL-SCNC: 4.3 MMOL/L (ref 3.5–5.2)
PROCALCITONIN SERPL-MCNC: 0.04 NG/ML (ref 0–0.25)
PROT SERPL-MCNC: 7.4 G/DL (ref 6–8.5)
PSA SERPL-MCNC: 3.81 NG/ML (ref 0–4)
SODIUM SERPL-SCNC: 140 MMOL/L (ref 136–145)
URATE SERPL-MCNC: 6 MG/DL (ref 3.4–7)

## 2025-01-27 ENCOUNTER — OFFICE VISIT (OUTPATIENT)
Age: 60
End: 2025-01-27
Payer: COMMERCIAL

## 2025-01-27 VITALS — HEART RATE: 75 BPM | OXYGEN SATURATION: 96 % | SYSTOLIC BLOOD PRESSURE: 137 MMHG | DIASTOLIC BLOOD PRESSURE: 88 MMHG

## 2025-01-27 DIAGNOSIS — N40.0 BPH WITHOUT OBSTRUCTION/LOWER URINARY TRACT SYMPTOMS: ICD-10-CM

## 2025-01-27 DIAGNOSIS — R97.20 ELEVATED PROSTATE SPECIFIC ANTIGEN (PSA): ICD-10-CM

## 2025-01-27 DIAGNOSIS — N52.01 ERECTILE DYSFUNCTION DUE TO ARTERIAL INSUFFICIENCY: Primary | ICD-10-CM

## 2025-01-27 PROCEDURE — 99214 OFFICE O/P EST MOD 30 MIN: CPT | Performed by: STUDENT IN AN ORGANIZED HEALTH CARE EDUCATION/TRAINING PROGRAM

## 2025-01-27 RX ORDER — TADALAFIL 5 MG/1
5 TABLET ORAL DAILY
Qty: 90 TABLET | Refills: 3 | Status: SHIPPED | OUTPATIENT
Start: 2025-01-27 | End: 2025-01-27

## 2025-01-27 RX ORDER — TADALAFIL 5 MG/1
5 TABLET ORAL DAILY
Qty: 90 TABLET | Refills: 3 | Status: SHIPPED | OUTPATIENT
Start: 2025-01-27

## 2025-01-27 NOTE — PROGRESS NOTES
"     Follow Up Office Visit      Patient Name: Duncan Crouch  : 1965   MRN: [id number]     Chief Complaint:    Chief Complaint   Patient presents with    Elevated PSA       Referring Provider: No ref. provider found    History of Present Illness: Duncan Crouch is a 59 y.o. male who presents today for follow up of elevated PSA, BPH, ED.  Patient establish care with me in 2024.  PSA was 6.49 at that time.  Underwent MRI prostate demonstrating no peripheral zone lesions and indeterminate PI-RADS 3 lesions in the transition zone in the setting of significant nodularity throughout the transition zone consistent with BPH.  The patient's prostate volume is roughly 50 g.  PSA density not overly concerning.  He underwent select MDX urinary biomarker screening came back \"very low risk\" for prostate cancer.  This gave us reassurance that it would be safe to monitor the patient's PSA.  Patient's PSA rise is likely just related to prostate enlargement.  He has now been on finasteride for more than 6 months and his PSA has responded well to this.  PSA is now in the normal range.  Patient complains of erectile dysfunction which we discussed could be related to his ongoing finasteride use.  Denies nocturnal erections.  Testosterone checked by PCP and was greater than 300 recently.    He is using 20 mg as needed Cialis but has been taking this inconsistently and not been taking this 1 or 2 hours prior to intercourse.  He is interested in trialing daily low-dose Cialis.    Lab Results   Component Value Date    PSA 3.810 2025    PSA 6.490 (H) 2024    PSA 6.480 (H) 2023         Subjective      Review of System: Review of Systems   Genitourinary: Negative.  Negative for decreased urine volume, difficulty urinating, dysuria, enuresis, flank pain, frequency, hematuria and urgency.      I have reviewed the ROS documented by my clinical staff, I have updated appropriately and I agree. Feliz JIMENEZ" MD [facility]    I have reviewed and the following portions of the patient's history were updated as appropriate: past family history, past medical history, past social history, past surgical history and problem list.    Medications:     Current Outpatient Medications:     allopurinol (ZYLOPRIM) 100 MG tablet, Take 1 tablet by mouth Daily., Disp: 90 tablet, Rfl: 2    Cholecalciferol (Vitamin D) 50 MCG (2000 UT) tablet, Take 2 tablets by mouth Daily., Disp: 60 tablet, Rfl: 5    colchicine 0.6 MG tablet, Take 1 tablet by mouth 2 (Two) Times a Day. Take 1.2 mg at first sign of flare, followed by 0.6 mg  after one hour.  Then 0.6mg twice a day the following day until resolves., Disp: 60 tablet, Rfl: 2    finasteride (Proscar) 5 MG tablet, Take 1 tablet by mouth Daily., Disp: 90 tablet, Rfl: 5    indomethacin (INDOCIN) 50 MG capsule, Take 1 capsule by mouth 3 (Three) Times a Day As Needed (Gout flare up)., Disp: 90 capsule, Rfl: 2    naproxen (NAPROSYN) 500 MG tablet, Take 1 tablet by mouth 2 (Two) Times a Day As Needed (gout flare up)., Disp: 60 tablet, Rfl: 2    ondansetron (ZOFRAN) 4 MG tablet, Take 1 tablet by mouth Every 6 (Six) Hours As Needed., Disp: 12 tablet, Rfl: 0    tadalafil (Cialis) 5 MG tablet, Take 1 tablet by mouth Daily., Disp: 90 tablet, Rfl: 3    Allergies:   No Known Allergies    IPSS Questionnaire (AUA-7):  Over the past month…    1)  Incomplete Emptying:       How often have you had a sensation of not emptying you had the sensation of not emptying your bladder completely after you finished urinating?  1 - Less than 1 time in 5   2)  Frequency:       How often have you had the urinate again less than two hours after you finished urinating?  2 - Less than half the time   3)  Intermittency:       How often have you found you stopped and started again several times when you urinated?   1 - Less than 1 time in 5   4) Urgency:      How often have you found it difficult to postpone urination?  1 - Less  than 1 time in 5   5) Weak Stream:      How often have you had a weak urinary stream?  1 - Less than 1 time in 5   6) Straining:       How often have you had to push or strain to begin urination?  0 - Not at all   7) Nocturia:      How many times did you most typically get up to urinate from the time you went to bed at night until the time you got up in the morning?  0 - None   Total Score:  6   The International Prostate Symptom Score (IPSS) is used to screen, diagnose, track symptoms of benign prostatic hyperplasia (BPH).   0-7 (Mild Symptoms) 8-19 (Moderate) 20-35 (Severe)   Quality of Life (QoL):  If you were to spend the rest of your life with your urinary condition just the way it is now, how would you feel about that? 2-Mostly Satisfied   Urine Leakage (Incontinence) 0-No Leakage       Objective     Physical Exam:   Vital Signs:   Vitals:    01/27/25 1005   BP: 137/88   Pulse: 75   SpO2: 96%     There is no height or weight on file to calculate BMI.     Physical Exam  Constitutional:       Appearance: Normal appearance.   HENT:      Head: Normocephalic and atraumatic.      Nose: Nose normal.   Eyes:      Extraocular Movements: Extraocular movements intact.      Conjunctiva/sclera: Conjunctivae normal.      Pupils: Pupils are equal, round, and reactive to light.   Musculoskeletal:         General: Normal range of motion.      Cervical back: Normal range of motion and neck supple.   Skin:     General: Skin is warm and dry.      Findings: No lesion or rash.   Neurological:      General: No focal deficit present.      Mental Status: He is alert and oriented to person, place, and time. Mental status is at baseline.   Psychiatric:         Mood and Affect: Mood normal.         Behavior: Behavior normal.         Labs:   Brief Urine Lab Results  (Last result in the past 365 days)        Color   Clarity   Blood   Leuk Est   Nitrite   Protein   CREAT   Urine HCG        04/29/24 1543 Yellow   Clear   Negative    Negative   Negative   Negative                        Lab Results   Component Value Date    GLUCOSE 78 01/08/2025    CALCIUM 9.1 01/08/2025     01/08/2025    K 4.3 01/08/2025    CO2 24.7 01/08/2025     01/08/2025    BUN 10 01/08/2025    CREATININE 1.04 01/08/2025    BCR 9.6 01/08/2025    ANIONGAP 11.3 01/08/2025       Lab Results   Component Value Date    WBC 7.75 01/08/2025    HGB 14.4 01/08/2025    HCT 42.4 01/08/2025    MCV 84.8 01/08/2025     01/08/2025       Images:   No Images in the past 120 days found..    Measures:   Tobacco:   Duncan Crouch  reports that he has never smoked. He has never used smokeless tobacco.      Assessment / Plan      Assessment/Plan:   59 y.o. male who presented today for follow up of elevated PSA, BPH, ED.  He will continue finasteride although we did discuss this could be worsening his erection quality.  He is interested in stopping the as needed high dose Cialis and transitioning to 5 mg daily Cialis for his ED and BPH.  Given PSA stability we will continue monitoring.  The PSA rises in the future we will discuss prostate biopsy.  We discussed transperineal biopsy via MRI/ultrasound fusion guided targeted biopsy if necessary.  He reports understanding.  He has noticed a benefit with finasteride, denies nocturia or weak stream concerns and he would be willing to continue this medication monotherapy.  He is not on alpha blockers.  We discussed if he decided to discontinue the finasteride his PSA will likely increase    Diagnoses and all orders for this visit:    1. Erectile dysfunction due to arterial insufficiency (Primary)  -     Discontinue: tadalafil (Cialis) 5 MG tablet; Take 1 tablet by mouth Daily.  Dispense: 90 tablet; Refill: 3  -     tadalafil (Cialis) 5 MG tablet; Take 1 tablet by mouth Daily.  Dispense: 90 tablet; Refill: 3    2. Elevated prostate specific antigen (PSA)  -     PSA Total+% Free (Serial); Future    3. BPH without obstruction/lower  urinary tract symptoms  -     PSA Total+% Free (Serial); Future  -     tadalafil (Cialis) 5 MG tablet; Take 1 tablet by mouth Daily.  Dispense: 90 tablet; Refill: 3           Follow Up:   Return in about 6 months (around 7/27/2025) for Follow Up after Labs.    I spent approximately 30 minutes providing clinical care for this patient; including review of patient's chart and provider documentation, face to face time spent with patient in examination room (obtaining history, performing physical exam, discussing diagnosis and management options), placing orders, and completing patient documentation.     Feliz Dominguez MD  [facility] [place]

## 2025-01-28 ENCOUNTER — TELEPHONE (OUTPATIENT)
Dept: UROLOGY | Facility: CLINIC | Age: 60
End: 2025-01-28
Payer: COMMERCIAL

## 2025-02-17 ENCOUNTER — OFFICE VISIT (OUTPATIENT)
Dept: INTERNAL MEDICINE | Facility: CLINIC | Age: 60
End: 2025-02-17
Payer: COMMERCIAL

## 2025-02-17 ENCOUNTER — LAB (OUTPATIENT)
Dept: INTERNAL MEDICINE | Facility: CLINIC | Age: 60
End: 2025-02-17
Payer: COMMERCIAL

## 2025-02-17 VITALS
OXYGEN SATURATION: 98 % | BODY MASS INDEX: 34.07 KG/M2 | HEART RATE: 76 BPM | HEIGHT: 70 IN | DIASTOLIC BLOOD PRESSURE: 80 MMHG | RESPIRATION RATE: 16 BRPM | SYSTOLIC BLOOD PRESSURE: 124 MMHG | WEIGHT: 238 LBS | TEMPERATURE: 97.3 F

## 2025-02-17 DIAGNOSIS — Z00.00 ANNUAL PHYSICAL EXAM: ICD-10-CM

## 2025-02-17 DIAGNOSIS — M1A.0710 IDIOPATHIC CHRONIC GOUT OF RIGHT FOOT WITHOUT TOPHUS: Primary | ICD-10-CM

## 2025-02-17 DIAGNOSIS — N52.9 ERECTILE DYSFUNCTION, UNSPECIFIED ERECTILE DYSFUNCTION TYPE: ICD-10-CM

## 2025-02-17 DIAGNOSIS — E55.9 VITAMIN D DEFICIENCY: ICD-10-CM

## 2025-02-17 DIAGNOSIS — M1A.0710 IDIOPATHIC CHRONIC GOUT OF RIGHT FOOT WITHOUT TOPHUS: ICD-10-CM

## 2025-02-17 DIAGNOSIS — Z23 NEED FOR PNEUMOCOCCAL 20-VALENT CONJUGATE VACCINATION: ICD-10-CM

## 2025-02-17 DIAGNOSIS — E53.8 B12 DEFICIENCY: ICD-10-CM

## 2025-02-17 DIAGNOSIS — R73.03 PREDIABETES: ICD-10-CM

## 2025-02-17 DIAGNOSIS — Z12.5 SCREENING FOR PROSTATE CANCER: ICD-10-CM

## 2025-02-17 DIAGNOSIS — Z23 NEED FOR TDAP VACCINATION: ICD-10-CM

## 2025-02-17 DIAGNOSIS — M10.041 ACUTE IDIOPATHIC GOUT OF RIGHT HAND: ICD-10-CM

## 2025-02-17 LAB
BASOPHILS # BLD AUTO: 0.08 10*3/MM3 (ref 0–0.2)
BASOPHILS NFR BLD AUTO: 1 % (ref 0–1.5)
DEPRECATED RDW RBC AUTO: 40.3 FL (ref 37–54)
EOSINOPHIL # BLD AUTO: 0.03 10*3/MM3 (ref 0–0.4)
EOSINOPHIL NFR BLD AUTO: 0.4 % (ref 0.3–6.2)
ERYTHROCYTE [DISTWIDTH] IN BLOOD BY AUTOMATED COUNT: 13.2 % (ref 12.3–15.4)
HBA1C MFR BLD: 5.5 % (ref 4.8–5.6)
HCT VFR BLD AUTO: 44.6 % (ref 37.5–51)
HGB BLD-MCNC: 14.7 G/DL (ref 13–17.7)
IMM GRANULOCYTES # BLD AUTO: 0.05 10*3/MM3 (ref 0–0.05)
IMM GRANULOCYTES NFR BLD AUTO: 0.7 % (ref 0–0.5)
LYMPHOCYTES # BLD AUTO: 3.16 10*3/MM3 (ref 0.7–3.1)
LYMPHOCYTES NFR BLD AUTO: 41.2 % (ref 19.6–45.3)
MCH RBC QN AUTO: 27.8 PG (ref 26.6–33)
MCHC RBC AUTO-ENTMCNC: 33 G/DL (ref 31.5–35.7)
MCV RBC AUTO: 84.3 FL (ref 79–97)
MONOCYTES # BLD AUTO: 0.43 10*3/MM3 (ref 0.1–0.9)
MONOCYTES NFR BLD AUTO: 5.6 % (ref 5–12)
NEUTROPHILS NFR BLD AUTO: 3.92 10*3/MM3 (ref 1.7–7)
NEUTROPHILS NFR BLD AUTO: 51.1 % (ref 42.7–76)
NRBC BLD AUTO-RTO: 0 /100 WBC (ref 0–0.2)
PLATELET # BLD AUTO: 216 10*3/MM3 (ref 140–450)
PMV BLD AUTO: 11.5 FL (ref 6–12)
RBC # BLD AUTO: 5.29 10*6/MM3 (ref 4.14–5.8)
WBC NRBC COR # BLD AUTO: 7.67 10*3/MM3 (ref 3.4–10.8)

## 2025-02-17 PROCEDURE — 83036 HEMOGLOBIN GLYCOSYLATED A1C: CPT | Performed by: INTERNAL MEDICINE

## 2025-02-17 PROCEDURE — 36415 COLL VENOUS BLD VENIPUNCTURE: CPT | Performed by: INTERNAL MEDICINE

## 2025-02-17 PROCEDURE — 99213 OFFICE O/P EST LOW 20 MIN: CPT | Performed by: INTERNAL MEDICINE

## 2025-02-17 PROCEDURE — 80061 LIPID PANEL: CPT | Performed by: INTERNAL MEDICINE

## 2025-02-17 PROCEDURE — 84550 ASSAY OF BLOOD/URIC ACID: CPT | Performed by: INTERNAL MEDICINE

## 2025-02-17 PROCEDURE — 99396 PREV VISIT EST AGE 40-64: CPT | Performed by: INTERNAL MEDICINE

## 2025-02-17 PROCEDURE — 81001 URINALYSIS AUTO W/SCOPE: CPT | Performed by: INTERNAL MEDICINE

## 2025-02-17 PROCEDURE — 80050 GENERAL HEALTH PANEL: CPT | Performed by: INTERNAL MEDICINE

## 2025-02-17 PROCEDURE — 82607 VITAMIN B-12: CPT | Performed by: INTERNAL MEDICINE

## 2025-02-17 PROCEDURE — G0103 PSA SCREENING: HCPCS | Performed by: INTERNAL MEDICINE

## 2025-02-17 PROCEDURE — 82306 VITAMIN D 25 HYDROXY: CPT | Performed by: INTERNAL MEDICINE

## 2025-02-17 RX ORDER — COLCHICINE 0.6 MG/1
0.6 TABLET ORAL 2 TIMES DAILY
Qty: 60 TABLET | Refills: 2 | Status: SHIPPED | OUTPATIENT
Start: 2025-02-17

## 2025-02-17 NOTE — PROGRESS NOTES
Male Physical Note      Date: 2025   Patient Name: Duncan Crouch  : 1965   MRN: [id number]     Chief Complaint:    Chief Complaint   Patient presents with    Annual Exam       History of Present Illness: Duncan Crouch is a 59 y.o. male who is here today for their annual health maintenance and physical.  Here for f/u with chronic gout, ED, bph, prediabetes.        Subjective      Review of Systems     I have reviewed the following portions of the patient's history and these were updated and discussed with the patient as appropriate: past family history, past medical history, past social history, past surgical history, and problem list.      Medications:     Current Outpatient Medications:     allopurinol (ZYLOPRIM) 100 MG tablet, Take 1 tablet by mouth Daily., Disp: 90 tablet, Rfl: 2    colchicine 0.6 MG tablet, Take 1 tablet by mouth 2 (Two) Times a Day. Take 1.2 mg at first sign of flare, followed by 0.6 mg  after one hour.  Then 0.6mg twice a day the following day until resolves., Disp: 60 tablet, Rfl: 2    finasteride (Proscar) 5 MG tablet, Take 1 tablet by mouth Daily., Disp: 90 tablet, Rfl: 5    indomethacin (INDOCIN) 50 MG capsule, Take 1 capsule by mouth 3 (Three) Times a Day As Needed (Gout flare up)., Disp: 90 capsule, Rfl: 2    naproxen (NAPROSYN) 500 MG tablet, Take 1 tablet by mouth 2 (Two) Times a Day As Needed (gout flare up)., Disp: 60 tablet, Rfl: 2    ondansetron (ZOFRAN) 4 MG tablet, Take 1 tablet by mouth Every 6 (Six) Hours As Needed., Disp: 12 tablet, Rfl: 0    tadalafil (Cialis) 5 MG tablet, Take 1 tablet by mouth Daily., Disp: 90 tablet, Rfl: 3    Allergies:   No Known Allergies    Immunizations:  Immunization History   Administered Date(s) Administered    COVID-19 ([place]) 2021    Covid-19 (Pf[employer]) Gray Cap Monovalent 2022    Dt, Ipv Adsorbed Historical 2017      Colorectal Screening:     Last Completed Colonoscopy            COLORECTAL  CANCER SCREENING (COLONOSCOPY - Every 10 Years) Tentatively due on 12/4/2034 12/04/2024  Colonoscopy, Scan    12/15/2023  SCANNED - COLONOSCOPY    08/12/2022  SCANNED - COLONOSCOPY                       Diet/Physical activity: avg    Sexual health: active    Sexual Health Inventory for Men (OUMAR)   Over the past 6 months:     1. How do you rate your confidence that you could get and keep an erection?  5 - Very high    2. When you had erections with sexual     stimulation, how often were your erections hard enough for penetration (entering your partner)?  5 - Almost always or always    3. During sexual intercourse, how often were you able to maintain your erection after you had penetrated (entered) your partner?  5 - Almost always or always    4. During sexual intercourse, how difficult was it to maintain your erection to completion of intercourse?  5 - Not difficult    5. When you attempted sexual intercourse, how often was it satisfactory for you?  5 - Almost always or always     Total Score: 25   The Sexual Health Inventory for Men further classifies ED severity with the following breakpoints:   1-7 (Severe ED) 8-11 (Moderate ED) 12-16 (Mild to Moderate ED) 17-21 (Mild ED)        PHQ-9 Depression Screening  Little interest or pleasure in doing things? Not at all   Feeling down, depressed, or hopeless? Several days   PHQ-2 Total Score 1   Trouble falling or staying asleep, or sleeping too much?     Feeling tired or having little energy?     Poor appetite or overeating?     Feeling bad about yourself - or that you are a failure or have let yourself or your family down?     Trouble concentrating on things, such as reading the newspaper or watching television?     Moving or speaking so slowly that other people could have noticed? Or the opposite - being so fidgety or restless that you have been moving around a lot more than usual?     Thoughts that you would be better off dead, or of hurting yourself in some way?  "    PHQ-9 Total Score     If you checked off any problems, how difficult have these problems made it for you to do your work, take care of things at home, or get along with other people? Not difficult at all       ADINA-7        Objective     Physical Exam:  Vital Signs:   Vitals:    02/17/25 1408   BP: 124/80   BP Location: Left arm   Patient Position: Sitting   Cuff Size: Adult   Pulse: 76   Resp: 16   Temp: 97.3 °F (36.3 °C)   TempSrc: Tympanic   SpO2: 98%   Weight: 108 kg (238 lb)   Height: 177.8 cm (70\")     Body mass index is 34.15 kg/m².     Physical Exam  Constitutional:       General: He is not in acute distress.     Appearance: Normal appearance. He is not ill-appearing.   HENT:      Right Ear: Tympanic membrane normal.      Left Ear: Tympanic membrane and ear canal normal.      Nose: No congestion or rhinorrhea.      Mouth/Throat:      Mouth: Mucous membranes are moist.      Pharynx: No oropharyngeal exudate.   Eyes:      Extraocular Movements: Extraocular movements intact.      Conjunctiva/sclera: Conjunctivae normal.      Pupils: Pupils are equal, round, and reactive to light.   Cardiovascular:      Rate and Rhythm: Normal rate and regular rhythm.      Heart sounds: No murmur heard.  Pulmonary:      Effort: Pulmonary effort is normal. No respiratory distress.   Abdominal:      General: Abdomen is flat. Bowel sounds are normal.      Palpations: Abdomen is soft.      Tenderness: There is no abdominal tenderness.   Musculoskeletal:      Right lower leg: No edema.      Left lower leg: No edema.   Skin:     General: Skin is warm and dry.      Findings: No rash.   Neurological:      General: No focal deficit present.      Mental Status: He is alert and oriented to person, place, and time. Mental status is at baseline.   Psychiatric:         Mood and Affect: Mood normal.         Behavior: Behavior normal.         Procedures    LABS:   Lab Results   Component Value Date    HGBA1C 5.60 02/09/2024     No results " "found for: \"MICROALBUR\", \"[id number]\"     Assessment / Plan      Assessment/Plan:   Diagnoses and all orders for this visit:    1. Idiopathic chronic gout of right foot without tophus (Primary)  -     Uric acid; Future    2. Screening for prostate cancer  -     PSA Screen    3. Annual physical exam  -     Hemoglobin A1c  -     Vitamin B12  -     Lipid Panel  -     Comprehensive Metabolic Panel  -     CBC & Differential  -     Urinalysis With Microscopic - Urine, Clean Catch  -     TSH Rfx On Abnormal To Free T4    4. Vitamin D deficiency  -     Vitamin D,25-Hydroxy    5. Erectile dysfunction, unspecified erectile dysfunction type    6. B12 deficiency    7. Prediabetes    8. Acute idiopathic gout of right hand  -     colchicine 0.6 MG tablet; Take 1 tablet by mouth 2 (Two) Times a Day. Take 1.2 mg at first sign of flare, followed by 0.6 mg  after one hour.  Then 0.6mg twice a day the following day until resolves.  Dispense: 60 tablet; Refill: 2    9. Need for pneumococcal 20-valent conjugate vaccination  -     Pneumococcal Conjugate Vaccine 20-Valent (PCV20)    10. Need for Tdap vaccination  -     Tdap Vaccine => 6yo IM (BOOSTRIX/ADACEL)         BMI is >= 30 and <35. (Class 1 Obesity). The following options were offered after discussion;: weight loss educational material (shared in after visit summary)       Follow Up:   Return in about 6 months (around 8/17/2025).    Healthcare Maintenance:   Counseling provided on exercise, nutrition, age-appropriate screening test, and appropriate lab studies.   Duncan Crouch voices understanding and acceptance of this advice and will call back with any further questions or concerns. AVS with preventive healthcare tips printed for patient.     Reviewed the following with the patient: [employer] educational material given to patient.      Marocs Allan,    [facility]  "

## 2025-02-18 LAB
25(OH)D3 SERPL-MCNC: 25.5 NG/ML (ref 30–100)
ALBUMIN SERPL-MCNC: 4.3 G/DL (ref 3.5–5.2)
ALBUMIN/GLOB SERPL: 1.4 G/DL
ALP SERPL-CCNC: 95 U/L (ref 39–117)
ALT SERPL W P-5'-P-CCNC: 31 U/L (ref 1–41)
ANION GAP SERPL CALCULATED.3IONS-SCNC: 10.6 MMOL/L (ref 5–15)
AST SERPL-CCNC: 30 U/L (ref 1–40)
BACTERIA UR QL AUTO: NORMAL /HPF
BILIRUB SERPL-MCNC: 0.9 MG/DL (ref 0–1.2)
BILIRUB UR QL STRIP: NEGATIVE
BUN SERPL-MCNC: 11 MG/DL (ref 8–23)
BUN/CREAT SERPL: 10.7 (ref 7–25)
CALCIUM SPEC-SCNC: 9.2 MG/DL (ref 8.6–10.5)
CHLORIDE SERPL-SCNC: 103 MMOL/L (ref 98–107)
CHOLEST SERPL-MCNC: 201 MG/DL (ref 0–200)
CLARITY UR: CLEAR
CO2 SERPL-SCNC: 23.4 MMOL/L (ref 22–29)
COLOR UR: YELLOW
CREAT SERPL-MCNC: 1.03 MG/DL (ref 0.76–1.27)
EGFRCR SERPLBLD CKD-EPI 2021: 83.2 ML/MIN/1.73
GLOBULIN UR ELPH-MCNC: 3.1 GM/DL
GLUCOSE SERPL-MCNC: 83 MG/DL (ref 65–99)
GLUCOSE UR STRIP-MCNC: NEGATIVE MG/DL
HDLC SERPL-MCNC: 77 MG/DL (ref 40–60)
HGB UR QL STRIP.AUTO: NEGATIVE
HYALINE CASTS UR QL AUTO: NORMAL /LPF
KETONES UR QL STRIP: NEGATIVE
LDLC SERPL CALC-MCNC: 107 MG/DL (ref 0–100)
LDLC/HDLC SERPL: 1.36 {RATIO}
LEUKOCYTE ESTERASE UR QL STRIP.AUTO: NEGATIVE
NITRITE UR QL STRIP: NEGATIVE
PH UR STRIP.AUTO: 6 [PH] (ref 5–8)
POTASSIUM SERPL-SCNC: 4.3 MMOL/L (ref 3.5–5.2)
PROT SERPL-MCNC: 7.4 G/DL (ref 6–8.5)
PROT UR QL STRIP: NEGATIVE
PSA SERPL-MCNC: 3.48 NG/ML (ref 0–4)
RBC # UR STRIP: NORMAL /HPF
REF LAB TEST METHOD: NORMAL
SODIUM SERPL-SCNC: 137 MMOL/L (ref 136–145)
SP GR UR STRIP: 1.02 (ref 1–1.03)
SQUAMOUS #/AREA URNS HPF: NORMAL /HPF
TRIGL SERPL-MCNC: 96 MG/DL (ref 0–150)
TSH SERPL DL<=0.05 MIU/L-ACNC: 1.09 UIU/ML (ref 0.27–4.2)
URATE SERPL-MCNC: 6.4 MG/DL (ref 3.4–7)
UROBILINOGEN UR QL STRIP: NORMAL
VIT B12 BLD-MCNC: 758 PG/ML (ref 211–946)
VLDLC SERPL-MCNC: 17 MG/DL (ref 5–40)
WBC # UR STRIP: NORMAL /HPF

## 2025-02-19 DIAGNOSIS — E55.9 VITAMIN D DEFICIENCY: Primary | ICD-10-CM

## 2025-02-19 RX ORDER — CHOLECALCIFEROL (VITAMIN D3) 50 MCG
2000 TABLET ORAL DAILY
Qty: 90 TABLET | Refills: 5 | Status: SHIPPED | OUTPATIENT
Start: 2025-02-19

## 2025-06-21 DIAGNOSIS — R97.20 ELEVATED PSA: ICD-10-CM

## 2025-06-23 RX ORDER — FINASTERIDE 5 MG/1
5 TABLET, FILM COATED ORAL DAILY
Qty: 90 TABLET | Refills: 5 | Status: SHIPPED | OUTPATIENT
Start: 2025-06-23

## 2025-07-28 ENCOUNTER — LAB (OUTPATIENT)
Facility: HOSPITAL | Age: 60
End: 2025-07-28
Payer: COMMERCIAL

## 2025-07-28 DIAGNOSIS — N40.0 BPH WITHOUT OBSTRUCTION/LOWER URINARY TRACT SYMPTOMS: ICD-10-CM

## 2025-07-28 DIAGNOSIS — R97.20 ELEVATED PROSTATE SPECIFIC ANTIGEN (PSA): ICD-10-CM

## 2025-07-28 PROCEDURE — 84153 ASSAY OF PSA TOTAL: CPT

## 2025-07-28 PROCEDURE — 36415 COLL VENOUS BLD VENIPUNCTURE: CPT

## 2025-07-28 PROCEDURE — 84154 ASSAY OF PSA FREE: CPT

## 2025-07-30 ENCOUNTER — OFFICE VISIT (OUTPATIENT)
Dept: UROLOGY | Facility: CLINIC | Age: 60
End: 2025-07-30
Payer: COMMERCIAL

## 2025-07-30 DIAGNOSIS — R97.20 ELEVATED PROSTATE SPECIFIC ANTIGEN (PSA): Primary | ICD-10-CM

## 2025-07-30 DIAGNOSIS — N13.8 BPH WITH OBSTRUCTION/LOWER URINARY TRACT SYMPTOMS: ICD-10-CM

## 2025-07-30 DIAGNOSIS — N52.01 ERECTILE DYSFUNCTION DUE TO ARTERIAL INSUFFICIENCY: ICD-10-CM

## 2025-07-30 DIAGNOSIS — N40.1 BPH WITH OBSTRUCTION/LOWER URINARY TRACT SYMPTOMS: ICD-10-CM

## 2025-07-30 LAB
PSA FREE MFR SERPL: 6.4 %
PSA FREE SERPL-MCNC: 0.23 NG/ML
PSA SERPL-MCNC: 3.6 NG/ML (ref 0–4)

## 2025-07-30 PROCEDURE — 99214 OFFICE O/P EST MOD 30 MIN: CPT | Performed by: STUDENT IN AN ORGANIZED HEALTH CARE EDUCATION/TRAINING PROGRAM

## 2025-07-30 NOTE — PROGRESS NOTES
"     Follow Up Office Visit      Patient Name: Duncan Crouch  : 1965   MRN: [id number]     Chief Complaint:    Chief Complaint   Patient presents with    Erectile dysfunction due to arterial insufficiency    Elevated PSA       Referring Provider: No ref. provider found    History of Present Illness: Duncan Crouch is a 59 y.o. male who presents today for follow up of elevated PSA.  Relevant history is as follows:    Patient establish care with me in 2024.  PSA was 6.49 at that time.  Underwent MRI prostate demonstrating no peripheral zone lesions and indeterminate PI-RADS 3 lesions in the transition zone in the setting of significant nodularity throughout the transition zone consistent with BPH.  The patient's prostate volume is roughly 50 g.  PSA density not overly concerning.  He underwent select MDX urinary biomarker screening came back \"very low risk\" for prostate cancer.  This gave us reassurance that it would be safe to monitor the patient's PSA.  Patient's PSA rise is likely just related to prostate enlargement.        =======  Sees me back at 6 months.  PSA is stable.    Lab Results   Component Value Date    PSA 3.6 2025    PSA 3.480 2025    PSA 3.810 2025    PSA 6.490 (H) 2024    PSA 6.480 (H) 2023     Was previously taking high dose Cialis for ED, transitioned low dose Cialis 5 mg daily for ED and LUTS management.  Daily Cialis is working very well for him. He reports stable erections.     He denies any urinary concerns on Finasteride monotherapy    Subjective      Review of System: Review of Systems   Genitourinary: Negative.       I have reviewed the ROS documented by my clinical staff, I have updated appropriately and I agree. Feliz Dominguez MD    I have reviewed and the following portions of the patient's history were updated as appropriate: past family history, past medical history, past social history, past surgical history and problem " list.    Medications:     Current Outpatient Medications:     allopurinol (ZYLOPRIM) 100 MG tablet, Take 1 tablet by mouth Daily., Disp: 90 tablet, Rfl: 2    Cholecalciferol (Vitamin D) 50 MCG (2000 UT) tablet, Take 1 tablet by mouth Daily., Disp: 90 tablet, Rfl: 5    finasteride (PROSCAR) 5 MG tablet, TAKE 1 TABLET BY MOUTH DAILY, Disp: 90 tablet, Rfl: 5    indomethacin (INDOCIN) 50 MG capsule, Take 1 capsule by mouth 3 (Three) Times a Day As Needed (Gout flare up)., Disp: 90 capsule, Rfl: 2    naproxen (NAPROSYN) 500 MG tablet, Take 1 tablet by mouth 2 (Two) Times a Day As Needed (gout flare up)., Disp: 60 tablet, Rfl: 2    tadalafil (Cialis) 5 MG tablet, Take 1 tablet by mouth Daily., Disp: 90 tablet, Rfl: 3    colchicine 0.6 MG tablet, Take 1 tablet by mouth 2 (Two) Times a Day. Take 1.2 mg at first sign of flare, followed by 0.6 mg  after one hour.  Then 0.6mg twice a day the following day until resolves. (Patient not taking: Reported on 7/30/2025), Disp: 60 tablet, Rfl: 2    ondansetron (ZOFRAN) 4 MG tablet, Take 1 tablet by mouth Every 6 (Six) Hours As Needed. (Patient not taking: Reported on 7/30/2025), Disp: 12 tablet, Rfl: 0    Allergies:   No Known Allergies    IPSS Questionnaire (AUA-7):  Over the past month…    1)  Incomplete Emptying:       How often have you had a sensation of not emptying you had the sensation of not emptying your bladder completely after you finished urinating?  1 - Less than 1 time in 5   2)  Frequency:       How often have you had the urinate again less than two hours after you finished urinating?  2 - Less than half the time   3)  Intermittency:       How often have you found you stopped and started again several times when you urinated?   1 - Less than 1 time in 5   4) Urgency:      How often have you found it difficult to postpone urination?  1 - Less than 1 time in 5   5) Weak Stream:      How often have you had a weak urinary stream?  1 - Less than 1 time in 5   6)  Straining:       How often have you had to push or strain to begin urination?  1 - Less than 1 time in 5   7) Nocturia:      How many times did you most typically get up to urinate from the time you went to bed at night until the time you got up in the morning?  0 - None   Total Score:  7   The International Prostate Symptom Score (IPSS) is used to screen, diagnose, track symptoms of benign prostatic hyperplasia (BPH).   0-7 (Mild Symptoms) 8-19 (Moderate) 20-35 (Severe)   Quality of Life (QoL):  If you were to spend the rest of your life with your urinary condition just the way it is now, how would you feel about that? 1-Pleased   Urine Leakage (Incontinence) 0-No Leakage         Objective     Physical Exam:   Vital Signs: There were no vitals filed for this visit.  There is no height or weight on file to calculate BMI.     Physical Exam  Constitutional:       Appearance: Normal appearance.   HENT:      Head: Normocephalic and atraumatic.      Nose: Nose normal.   Eyes:      Extraocular Movements: Extraocular movements intact.      Conjunctiva/sclera: Conjunctivae normal.      Pupils: Pupils are equal, round, and reactive to light.   Musculoskeletal:         General: Normal range of motion.      Cervical back: Normal range of motion and neck supple.   Skin:     General: Skin is warm and dry.      Findings: No lesion or rash.   Neurological:      General: No focal deficit present.      Mental Status: He is alert and oriented to person, place, and time. Mental status is at baseline.   Psychiatric:         Mood and Affect: Mood normal.         Behavior: Behavior normal.         Labs:   Brief Urine Lab Results  (Last result in the past 365 days)        Color   Clarity   Blood   Leuk Est   Nitrite   Protein   CREAT   Urine HCG        02/17/25 1436 Yellow   Clear   Negative   Negative   Negative   Negative                        Lab Results   Component Value Date    GLUCOSE 83 02/17/2025    CALCIUM 9.2 02/17/2025    NA  "137 02/17/2025    K 4.3 02/17/2025    CO2 23.4 02/17/2025     02/17/2025    BUN 11 02/17/2025    CREATININE 1.03 02/17/2025    BCR 10.7 02/17/2025    ANIONGAP 10.6 02/17/2025       Lab Results   Component Value Date    WBC 7.67 02/17/2025    HGB 14.7 02/17/2025    HCT 44.6 02/17/2025    MCV 84.3 02/17/2025     02/17/2025       Images:   No Images in the past 120 days found..    Measures:   Tobacco:   Duncan Crouch  reports that he has never smoked. He has never been exposed to tobacco smoke. He has never used smokeless tobacco.     Assessment / Plan      Assessment/Plan:   59 y.o. male who presented today for follow up of elevated PSA.  MRI prostate reassuring.  Select MDX urinary biomarker screening came back \"very low risk\".  His risk of prostate cancer is fairly small.  He does have an enlarged prostate which has responded well to finasteride.  PSA has reduced and his urinary symptoms are stable.  He will continue 5 mg daily Cialis for his ED management.    Diagnoses and all orders for this visit:    1. BPH with obstruction/lower urinary tract symptoms (Primary)  - cont Finasteride 5 mg daily    2. Elevated prostate specific antigen (PSA)  - recheck PSA in 1 year given stability    3. Erectile dysfunction due to arterial insufficiency  - cont 5 mg daily Cialis          Follow Up:   Return in about 1 year (around 7/30/2026) for 1 year with PSA prior .    I spent approximately 20 minutes providing clinical care for this patient; including review of patient's chart and provider documentation, face to face time spent with patient in examination room (obtaining history, performing physical exam, discussing diagnosis and management options), placing orders, and completing patient documentation.     Feliz Dominguez MD  [facility] [place]   "

## 2025-08-06 DIAGNOSIS — M1A.0710 IDIOPATHIC CHRONIC GOUT OF RIGHT FOOT WITHOUT TOPHUS: ICD-10-CM

## 2025-08-06 RX ORDER — ALLOPURINOL 100 MG/1
100 TABLET ORAL DAILY
Qty: 90 TABLET | Refills: 2 | Status: SHIPPED | OUTPATIENT
Start: 2025-08-06